# Patient Record
Sex: MALE | Race: BLACK OR AFRICAN AMERICAN | Employment: FULL TIME | URBAN - METROPOLITAN AREA
[De-identification: names, ages, dates, MRNs, and addresses within clinical notes are randomized per-mention and may not be internally consistent; named-entity substitution may affect disease eponyms.]

---

## 2017-01-03 ENCOUNTER — TELEPHONE (OUTPATIENT)
Dept: CARDIOLOGY CLINIC | Age: 72
End: 2017-01-03

## 2017-01-03 NOTE — TELEPHONE ENCOUNTER
Message        ----- Message from Rita Mae NP sent at 1/3/2017  4:44 PM EST -----       Hold 1 day prior to procedure, resuming evening of colonoscopy the the PM dose, in total she will miss 3 tabs. Called pt's wife,verified on hippa form, relayed message above to pt and she wrote everything down. She verbalized that she understood everything.

## 2017-01-03 NOTE — TELEPHONE ENCOUNTER
Returned pt's call,spoke to wife on hippa form, she stated that pt is having a colonscopy on the 1/13/17. When can pt stop his Eliquis prior to procedure? Please advise, thank you.

## 2017-01-13 ENCOUNTER — ANESTHESIA EVENT (OUTPATIENT)
Dept: ENDOSCOPY | Age: 72
End: 2017-01-13
Payer: COMMERCIAL

## 2017-01-13 ENCOUNTER — ANESTHESIA (OUTPATIENT)
Dept: ENDOSCOPY | Age: 72
End: 2017-01-13
Payer: COMMERCIAL

## 2017-01-13 PROCEDURE — 74011000250 HC RX REV CODE- 250

## 2017-01-13 PROCEDURE — 74011250636 HC RX REV CODE- 250/636

## 2017-01-13 RX ORDER — PROPOFOL 10 MG/ML
INJECTION, EMULSION INTRAVENOUS AS NEEDED
Status: DISCONTINUED | OUTPATIENT
Start: 2017-01-13 | End: 2017-01-13 | Stop reason: HOSPADM

## 2017-01-13 RX ORDER — LIDOCAINE HYDROCHLORIDE 20 MG/ML
INJECTION, SOLUTION EPIDURAL; INFILTRATION; INTRACAUDAL; PERINEURAL AS NEEDED
Status: DISCONTINUED | OUTPATIENT
Start: 2017-01-13 | End: 2017-01-13 | Stop reason: HOSPADM

## 2017-01-13 RX ADMIN — PROPOFOL 50 MG: 10 INJECTION, EMULSION INTRAVENOUS at 10:52

## 2017-01-13 RX ADMIN — PROPOFOL 150 MG: 10 INJECTION, EMULSION INTRAVENOUS at 10:42

## 2017-01-13 RX ADMIN — LIDOCAINE HYDROCHLORIDE 40 MG: 20 INJECTION, SOLUTION EPIDURAL; INFILTRATION; INTRACAUDAL; PERINEURAL at 10:42

## 2017-01-13 NOTE — ANESTHESIA POSTPROCEDURE EVALUATION
Post-Anesthesia Evaluation and Assessment    Patient: Indu Waddell MRN: [id number]  SSN: [id number]    YOB: 1945  Age: 73 y.o. Sex: male       Cardiovascular Function/Vital Signs  Visit Vitals    /61    Pulse (!) 53    Temp 36.8 °C (98.3 °F)    Resp 20    Ht 5' 8\" (1.727 m)    Wt 122.5 kg (270 lb 2 oz)    SpO2 100%    BMI 41.07 kg/m2       Patient is status post MAC anesthesia for Procedure(s):  COLONOSCOPY  COLON BIOPSY  ENDOSCOPIC POLYPECTOMY. Nausea/Vomiting: None    Postoperative hydration reviewed and adequate. Pain:  Pain Scale 1: Numeric (0 - 10) (01/13/17 1114)  Pain Intensity 1: 0 (01/13/17 1114)   Managed    Neurological Status: At baseline    Mental Status and Level of Consciousness: Arousable    Pulmonary Status:   O2 Device: Room air (01/13/17 1114)   Adequate oxygenation and airway patent    Complications related to anesthesia: None    Post-anesthesia assessment completed.  No concerns    Signed By: Iveth Castillo MD     January 13, 2017

## 2017-01-13 NOTE — ANESTHESIA PREPROCEDURE EVALUATION
Anesthetic History     Increased risk of difficult airway          Review of Systems / Medical History  Patient summary reviewed, nursing notes reviewed and pertinent labs reviewed    Pulmonary        Sleep apnea: CPAP           Neuro/Psych   Within defined limits           Cardiovascular    Hypertension        Dysrhythmias : atrial fibrillation  CAD    Exercise tolerance: >4 METS  Comments: Echo last year showed normal EF and normal valves   GI/Hepatic/Renal  Within defined limits              Endo/Other    Diabetes    Morbid obesity     Other Findings            Physical Exam    Airway  Mallampati: III  TM Distance: 4 - 6 cm  Neck ROM: decreased range of motion   Mouth opening: Normal     Cardiovascular  Regular rate and rhythm,  S1 and S2 normal,  no murmur, click, rub, or gallop  Rhythm: regular  Rate: normal         Dental  No notable dental hx       Pulmonary  Breath sounds clear to auscultation               Abdominal  GI exam deferred       Other Findings            Anesthetic Plan    ASA: 3  Anesthesia type: MAC            Anesthetic plan and risks discussed with: Patient

## 2017-03-07 ENCOUNTER — OFFICE VISIT (OUTPATIENT)
Dept: CARDIOLOGY CLINIC | Age: 72
End: 2017-03-07

## 2017-03-07 VITALS
SYSTOLIC BLOOD PRESSURE: 140 MMHG | DIASTOLIC BLOOD PRESSURE: 82 MMHG | WEIGHT: 268.8 LBS | HEART RATE: 60 BPM | HEIGHT: 68 IN | RESPIRATION RATE: 18 BRPM | OXYGEN SATURATION: 96 % | BODY MASS INDEX: 40.74 KG/M2

## 2017-03-07 DIAGNOSIS — I50.22 CHRONIC SYSTOLIC HEART FAILURE (HCC): ICD-10-CM

## 2017-03-07 DIAGNOSIS — I48.0 PAROXYSMAL ATRIAL FIBRILLATION (HCC): Primary | ICD-10-CM

## 2017-03-07 DIAGNOSIS — I10 ESSENTIAL HYPERTENSION, BENIGN: ICD-10-CM

## 2017-03-07 NOTE — MR AVS SNAPSHOT
Visit Information Date & Time Provider Department Dept. Phone Encounter #  
 3/7/2017 10:45 AM [address], MD [facility] [phone] [id number] Upcoming Health Maintenance Date Due Hepatitis C Screening 1945 FOOT EXAM Q1 9/18/1957 MICROALBUMIN Q1 9/18/1957 EYE EXAM RETINAL OR DILATED Q1 9/18/1957 DTaP/Tdap/Td series (1 - Tdap) 9/18/1968 FOBT Q 1 YEAR AGE 50-75 9/18/1997 ZOSTER VACCINE AGE 60> 9/18/2007 HEMOGLOBIN A1C Q6M 6/27/2011 LIPID PANEL Q1 12/28/2011 GLAUCOMA SCREENING Q2Y 9/18/2012 Pneumococcal 65+ Low/Medium Risk (1 of 2 - PCV13) 9/18/2012 MEDICARE YEARLY EXAM 9/18/2012 INFLUENZA AGE 9 TO ADULT 8/1/2016 Allergies as of 3/7/2017  Review Complete On: 3/7/2017 By: Meagan Hu NP No Known Allergies Current Immunizations  Never Reviewed No immunizations on file. Not reviewed this visit You Were Diagnosed With   
  
 Codes Comments Paroxysmal atrial fibrillation (HCC)    -  Primary ICD-10-CM: I48.0 ICD-9-CM: 427.31 Essential hypertension, benign     ICD-10-CM: I10 
ICD-9-CM: 335. 1 Chronic systolic heart failure (HCC)     ICD-10-CM: I50.22 ICD-9-CM: 428.22 Vitals BP Pulse Resp Height(growth percentile) Weight(growth percentile) SpO2  
 160/78 (BP 1 Location: Right arm, BP Patient Position: Sitting) 60 18 5' 8\" (1.727 m) 268 lb 12.8 oz (121.9 kg) 96% BMI Smoking Status 40.87 kg/m2 Never Smoker Vitals History BMI and BSA Data Body Mass Index Body Surface Area  
 40.87 kg/m 2 2.42 m 2 Preferred Pharmacy Pharmacy Name Phone [phone] - [address], [address] AT [place] [address] [phone] Your Updated Medication List  
  
   
This list is accurate as of: 3/7/17 11:27 AM.  Always use your most recent med list. amLODIPine 10 mg tablet Commonly known as:  [place] Citron Take  by mouth daily. carvedilol 6.25 mg tablet Commonly known as:  COREG  
TAKE 1 TABLET BY MOUTH TWICE DAILY  
  
 cpap machine kit  
by Does Not Apply route nightly. ELIQUIS 5 mg tablet Generic drug:  apixaban TAKE 1 TABLET BY MOUTH TWICE DAILY. STOP COUMADIN  
  
 hydroCHLOROthiazide 25 mg tablet Commonly known as:  HYDRODIURIL Take 25 mg by mouth daily. KLOR-CON 10 10 mEq tablet Generic drug:  potassium chloride SR Take 20 mEq by mouth daily. lisinopril 40 mg tablet Commonly known as:  Tiffani Acosta Take 40 mg by mouth daily. simvastatin 20 mg tablet Commonly known as:  ZOCOR Take  by mouth nightly. [facility] ASPIRIN 81 mg tablet Generic drug:  aspirin delayed-release Take  by mouth daily. We Performed the Following AMB POC EKG ROUTINE W/ 12 LEADS, INTER & REP [83772 CPT(R)] AMB POC EKG ROUTINE W/ 12 LEADS, INTER & REP [76271 CPT(R)] Introducing [facility] SERVICES! [facility] introduces [employer] patient portal. Now you can access parts of your medical record, email your doctor's office, and request medication refills online. 1. In your internet browser, go to [email or web address] 2. Click on the First Time User? Click Here link in the Sign In box. You will see the New Member Sign Up page. 3. Enter your [employer] Access Code exactly as it appears below. You will not need to use this code after youve completed the sign-up process. If you do not sign up before the expiration date, you must request a new code. · [employer] Access Code: [email or web address] Expires: 4/13/2017  9:23 AM 
 
4. Enter the last four digits of your Social Security Number (xxxx) and Date of Birth (mm/dd/yyyy) as indicated and click Submit. You will be taken to the next sign-up page. 5. Create a TheraCell ID.  This will be your TheraCell login ID and cannot be changed, so think of one that is secure and easy to remember. 6. Create a [employer] password. You can change your password at any time. 7. Enter your Password Reset Question and Answer. This can be used at a later time if you forget your password. 8. Enter your e-mail address. You will receive e-mail notification when new information is available in [address] 9. Click Sign Up. You can now view and download portions of your medical record. 10. Click the Download Summary menu link to download a portable copy of your medical information. If you have questions, please visit the Frequently Asked Questions section of the [employer] website. Remember, [employer] is NOT to be used for urgent needs. For medical emergencies, dial 911. Now available from your iPhone and Android! Please provide this summary of care documentation to your next provider. Your primary care clinician is listed as Catie Peres. If you have any questions after today's visit, please call [phone].

## 2017-03-07 NOTE — PROGRESS NOTES
Craven Cogan NP  Subjective/HPI:     Jah Talbert is a 73 y.o. male is here for routine f/u. The patient denies chest pain/ shortness of breath, orthopnea, PND, LE edema, palpitations, syncope, presyncope or fatigue. Mr. Lori Kuhn reports feeling in his usual state of health, denies fluttering or palpitations associated with atrial fibrillation: As maintained warfarin therapy without any reported excessive bruising or bleeding, black stools. Discussed elevated blood pressure today 24 hour diet review was moderately high in sodium intake. PCP Provider  Kavya Melvin MD  Past Medical History:   Diagnosis Date    Arrhythmia     a. fib    Cardiomegaly     Cardiomyopathy in other diseases classified elsewhere ([address]     Diabetes ([facility] [address]     BORDERLINE    Difficult intubation     Hypertension     Other acute and subacute form of ischemic heart disease     Other ill-defined conditions(799.89)     ATHEROSCLEROSIS/HYPERLIPIDEMIA    PAF (paroxysmal atrial fibrillation) (HCC)     Shortness of breath     Unspecified sleep apnea     USES C-PAP/pt dosent know dr name      Past Surgical History:   Procedure Laterality Date    ABDOMEN SURGERY PROC UNLISTED      LEFT INGUINAL HERNIA    COLONOSCOPY N/A 1/13/2017    COLONOSCOPY performed by Jazmin Silver MD at [address] [place] [address]  1/13/2017         HX GI      COLONOSCOPY    HX ORTHOPAEDIC  2006    BILAT TKR    IN COLSC FLX W/REMOVAL LESION BY HOT BX FORCEPS  5/14/2014          No Known Allergies   Family History   Problem Relation Age of Onset    Heart Disease Mother      w/pacemaker    Diabetes Father     Diabetes Sister       Current Outpatient Prescriptions   Medication Sig    ELIQUIS 5 mg tablet TAKE 1 TABLET BY MOUTH TWICE DAILY. STOP COUMADIN    carvedilol (COREG) 6.25 mg tablet TAKE 1 TABLET BY MOUTH TWICE DAILY    cpap machine kit by Does Not Apply route nightly.     simvastatin (ZOCOR) 20 mg tablet Take  by mouth nightly.  potassium chloride SR (KLOR-CON 10) 10 mEq tablet Take 20 mEq by mouth daily.  aspirin delayed-release ([facility] ASPIRIN) 81 mg tablet Take  by mouth daily.  lisinopril (PRINIVIL, ZESTRIL) 40 mg tablet Take 40 mg by mouth daily.  amlodipine (NORVASC) 10 mg tablet Take  by mouth daily.  hydrochlorothiazide (HYDRODIURIL) 25 mg tablet Take 25 mg by mouth daily. No current facility-administered medications for this visit. Vitals:    03/07/17 1046 03/07/17 1055 03/07/17 1127   BP: 152/80 160/78 140/82   Pulse: 60     Resp: 18     SpO2: 96%     Weight: 268 lb 12.8 oz (121.9 kg)     Height: 5' 8\" (1.727 m)       Social History     Social History    Marital status:      Spouse name: N/A    Number of children: N/A    Years of education: N/A     Occupational History    Not on file. Social History Main Topics    Smoking status: Never Smoker    Smokeless tobacco: Never Used    Alcohol use No    Drug use: No    Sexual activity: Not on file     Other Topics Concern    Not on file     Social History Narrative       I have reviewed the nurses notes, vitals, problem list, allergy list, medical history, family, social history and medications. Review of Symptoms:    General: Pt denies excessive weight gain or loss. Pt is able to conduct ADL's  HEENT: Denies blurred vision, headaches, epistaxis and difficulty swallowing. Respiratory: Denies shortness of breath, SERVIN, wheezing or stridor. Cardiovascular: Denies precordial pain, palpitations, edema or PND  Gastrointestinal: Denies poor appetite, indigestion, abdominal pain or blood in stool  Musculoskeletal: Denies pain or swelling from muscles or joints  Neurologic: Denies tremor, paresthesias, or sensory motor disturbance  Skin: Denies rash, itching or texture change. Physical Exam:      General: Well developed, in no acute distress, cooperative and alert  HEENT: No carotid bruits, no JVD, trach is midline. Neck Supple, PEERL, EOM intact. Heart:  Normal S1/S2 negative S3 or S4. Regular, no murmur, gallop or rub.   Respiratory: Clear bilaterally x 4, no wheezing or rales  Abdomen:   Soft, non-tender, no masses, bowel sounds are active.   Extremities:  No edema, normal cap refill, no cyanosis, atraumatic. Neuro: A&Ox3, speech clear, gait stable. Skin: Skin color is normal. No rashes or lesions.  Non diaphoretic  Vascular: 2+ pulses symmetric in all extremities    Cardiographics    ECG: Sinus bradycardia  Results for orders placed or performed during the hospital encounter of 12/27/10   EKG, 12 LEAD, INITIAL   Result Value Ref Range    Ventricular Rate 54 BPM    Atrial Rate 54 BPM    P-R Interval 176 ms    QRS Duration 128 ms    Q-T Interval 474 ms    QTC Calculation (Bezet) 449 ms    Calculated P Axis 44 degrees    Calculated R Axis -2 degrees    Calculated T Axis 84 degrees    Diagnosis       Sinus bradycardia with premature atrial complexes  Nonspecific intraventricular block  Nonspecific T wave abnormality  No major change  Confirmed by Carlos Braun M.D., Chaparrita Crealejandra ([id number]) on 12/27/2010 10:57:25 PM         Cardiology Labs:  Lab Results   Component Value Date/Time    Cholesterol, total 154 12/28/2010 03:10 AM    HDL Cholesterol 33 12/28/2010 03:10 AM    LDL, calculated 110.8 12/28/2010 03:10 AM    Triglyceride 51 12/28/2010 03:10 AM    CHOL/HDL Ratio 4.7 12/28/2010 03:10 AM       Lab Results   Component Value Date/Time    Sodium 142 06/29/2015 12:07 PM    Potassium 4.1 06/29/2015 12:07 PM    Chloride 101 06/29/2015 12:07 PM    CO2 26 06/29/2015 12:07 PM    Anion gap 11 12/28/2010 03:10 AM    Glucose 100 06/29/2015 12:07 PM    BUN 12 06/29/2015 12:07 PM    Creatinine 0.68 06/29/2015 12:07 PM    BUN/Creatinine ratio 18 06/29/2015 12:07 PM    GFR est  06/29/2015 12:07 PM    GFR est non-AA 99 06/29/2015 12:07 PM    Calcium 8.9 06/29/2015 12:07 PM    Bilirubin, total 0.5 12/27/2010 05:00 PM    AST (SGOT) 23 12/27/2010 05:00 PM    Alk. phosphatase 57 12/27/2010 05:00 PM    Protein, total 7.4 12/27/2010 05:00 PM    Albumin 3.2 12/27/2010 05:00 PM    Globulin 4.2 12/27/2010 05:00 PM    A-G Ratio 0.8 12/27/2010 05:00 PM    ALT (SGPT) 25 12/27/2010 05:00 PM           Assessment:     Assessment:     Ori Franklin was seen today for irregular heart beat. Diagnoses and all orders for this visit:    Paroxysmal atrial fibrillation (HCC)  -     AMB POC EKG ROUTINE W/ 12 LEADS, INTER & REP  -     AMB POC EKG ROUTINE W/ 12 LEADS, INTER & REP    Essential hypertension, benign    Chronic systolic heart failure ([facility] [address]        ICD-10-CM ICD-9-CM    1. Paroxysmal atrial fibrillation (HCC) I48.0 427.31 AMB POC EKG ROUTINE W/ 12 LEADS, INTER & REP      AMB POC EKG ROUTINE W/ 12 LEADS, INTER & REP   2. Essential hypertension, benign I10 401.1    3. Chronic systolic heart failure (HCC) I50.22 428.22      Orders Placed This Encounter    AMB POC EKG ROUTINE W/ 12 LEADS, INTER & REP     Order Specific Question:   Reason for Exam:     Answer:   ROUTINE    AMB POC EKG ROUTINE W/ 12 LEADS, INTER & REP     Order Specific Question:   Reason for Exam:     Answer:   ROUTINE        Plan:     Patient presents doing well and is stable from cardiac stand point. Continue current care and f/u in 6 months. 1.  Paroxysmal atrial fibrillation: Maintaining normal sinus rhythm bradycardic asymptomatic. Continue warfarin therapy  2. Hypertension repeat blood pressure improved 140/82 lengthy discussion with Mr. Bobbi Phan for diet exercise sodium restriction and weight loss with optimal goal of 10 pound weight loss in the next 6 months. 3.  Hyperlipidemia: Followed by primary care patient reports labs drawn approximately 2 months ago without any changes to medication  Monica Alba NP      [address] [facility]    3/7/2017         Agree with note as outlined by  NP. I confirm findings in history and physical exam. No additional findings noted.  Agree with plan as outlined above.     Shazia Tubbs MD

## 2017-05-04 RX ORDER — APIXABAN 5 MG/1
TABLET, FILM COATED ORAL
Qty: 60 TAB | Refills: 0 | Status: SHIPPED | OUTPATIENT
Start: 2017-05-04 | End: 2017-09-03 | Stop reason: SDUPTHER

## 2017-09-05 RX ORDER — CARVEDILOL 6.25 MG/1
TABLET ORAL
Qty: 60 TAB | Refills: 0 | Status: SHIPPED | OUTPATIENT
Start: 2017-09-05 | End: 2017-11-13 | Stop reason: SDUPTHER

## 2017-09-05 RX ORDER — APIXABAN 5 MG/1
TABLET, FILM COATED ORAL
Qty: 60 TAB | Refills: 0 | Status: SHIPPED | OUTPATIENT
Start: 2017-09-05 | End: 2017-09-12 | Stop reason: SDUPTHER

## 2017-09-05 RX ORDER — APIXABAN 5 MG/1
TABLET, FILM COATED ORAL
Qty: 60 TAB | Refills: 0 | Status: SHIPPED | OUTPATIENT
Start: 2017-09-05 | End: 2017-10-02 | Stop reason: SDUPTHER

## 2017-09-12 ENCOUNTER — OFFICE VISIT (OUTPATIENT)
Dept: CARDIOLOGY CLINIC | Age: 72
End: 2017-09-12

## 2017-09-12 VITALS
DIASTOLIC BLOOD PRESSURE: 72 MMHG | OXYGEN SATURATION: 98 % | HEART RATE: 44 BPM | SYSTOLIC BLOOD PRESSURE: 130 MMHG | RESPIRATION RATE: 20 BRPM | HEIGHT: 68 IN | BODY MASS INDEX: 41.97 KG/M2 | WEIGHT: 276.9 LBS

## 2017-09-12 DIAGNOSIS — I48.0 PAROXYSMAL ATRIAL FIBRILLATION (HCC): Primary | ICD-10-CM

## 2017-09-12 DIAGNOSIS — I10 ESSENTIAL HYPERTENSION: ICD-10-CM

## 2017-09-12 DIAGNOSIS — E78.00 HYPERCHOLESTEREMIA: ICD-10-CM

## 2017-09-12 DIAGNOSIS — I42.9 CARDIOMYOPATHY, UNSPECIFIED TYPE (HCC): ICD-10-CM

## 2017-09-12 NOTE — MR AVS SNAPSHOT
Visit Information Date & Time Provider Department Dept. Phone Encounter #  
 9/12/2017 10:00 AM Paramjit Rocha MD [facility] [phone] [id number] Follow-up Instructions Routing History Upcoming Health Maintenance Date Due Hepatitis C Screening 1945 FOOT EXAM Q1 9/18/1957 MICROALBUMIN Q1 9/18/1957 EYE EXAM RETINAL OR DILATED Q1 9/18/1957 DTaP/Tdap/Td series (1 - Tdap) 9/18/1968 FOBT Q 1 YEAR AGE 50-75 9/18/1997 ZOSTER VACCINE AGE 60> 7/18/2007 HEMOGLOBIN A1C Q6M 6/27/2011 LIPID PANEL Q1 12/28/2011 GLAUCOMA SCREENING Q2Y 9/18/2012 Pneumococcal 65+ Low/Medium Risk (1 of 2 - PCV13) 9/18/2012 MEDICARE YEARLY EXAM 9/18/2012 INFLUENZA AGE 9 TO ADULT 8/1/2017 Allergies as of 9/12/2017  Review Complete On: 9/12/2017 By: Almaz Malhotra NP No Known Allergies Current Immunizations  Never Reviewed No immunizations on file. Not reviewed this visit You Were Diagnosed With   
  
 Codes Comments Paroxysmal atrial fibrillation (HCC)    -  Primary ICD-10-CM: I48.0 ICD-9-CM: 427.31 Hypercholesteremia     ICD-10-CM: E78.00 ICD-9-CM: 272.0 Essential hypertension     ICD-10-CM: I10 
ICD-9-CM: 401.9 Cardiomyopathy, unspecified type ([facility]     ICD-10-CM: I42.9 ICD-9-CM: 425.4 Vitals BP Pulse Resp Height(growth percentile) Weight(growth percentile) SpO2  
 130/72 (BP 1 Location: Right arm, BP Patient Position: Sitting) (!) 44 20 5' 8\" (1.727 m) 276 lb 14.4 oz (125.6 kg) 98% BMI Smoking Status 42.1 kg/m2 Never Smoker Vitals History BMI and BSA Data Body Mass Index Body Surface Area  
 42.1 kg/m 2 2.45 m 2 [phone] [facility] [address], [address] AT [place] [address] [phone] Your Updated Medication List  
  
   
 This list is accurate as of: 9/12/17 10:59 AM.  Always use your most recent med list. amLODIPine 10 mg tablet Commonly known as:  [place] Bars Take  by mouth daily. carvedilol 6.25 mg tablet Commonly known as:  COREG  
TAKE 1 TABLET BY MOUTH TWICE DAILY  
  
 cpap machine kit  
by Does Not Apply route nightly. ELIQUIS 5 mg tablet Generic drug:  apixaban TAKE 1 TABLET BY MOUTH TWICE DAILY. STOP COUMADIN  
  
 hydroCHLOROthiazide 25 mg tablet Commonly known as:  HYDRODIURIL Take 25 mg by mouth daily. KLOR-CON 10 10 mEq tablet Generic drug:  potassium chloride SR Take 20 mEq by mouth daily. lisinopril 40 mg tablet Commonly known as:  Dorathy Massed Take 40 mg by mouth daily. simvastatin 20 mg tablet Commonly known as:  ZOCOR Take  by mouth nightly. [facility] ASPIRIN 81 mg tablet Generic drug:  aspirin delayed-release Take  by mouth daily. We Performed the Following AMB POC EKG ROUTINE W/ 12 LEADS, INTER & REP [25635 CPT(R)] To-Do List   
 09/13/2017 ECHO:  2D ECHO COMPLETE ADULT (TTE) W OR WO CONTR Introducing [facility] SERVICES! [facility] introduces [employer] patient portal. Now you can access parts of your medical record, email your doctor's office, and request medication refills online. 1. In your internet browser, go to [email or web address] 2. Click on the First Time User? Click Here link in the Sign In box. You will see the New Member Sign Up page. 3. Enter your [employer] Access Code exactly as it appears below. You will not need to use this code after youve completed the sign-up process. If you do not sign up before the expiration date, you must request a new code. · [employer] Access Code: [email or web address] Expires: 11/25/2017  1:54 PM 
 
4.  Enter the last four digits of your Social Security Number (xxxx) and Date of Birth (mm/dd/yyyy) as indicated and click Submit. You will be taken to the next sign-up page. 5. Create a [employer] ID. This will be your [employer] login ID and cannot be changed, so think of one that is secure and easy to remember. 6. Create a [employer] password. You can change your password at any time. 7. Enter your Password Reset Question and Answer. This can be used at a later time if you forget your password. 8. Enter your e-mail address. You will receive e-mail notification when new information is available in [address] 9. Click Sign Up. You can now view and download portions of your medical record. 10. Click the Download Summary menu link to download a portable copy of your medical information. If you have questions, please visit the Frequently Asked Questions section of the [employer] website. Remember, [employer] is NOT to be used for urgent needs. For medical emergencies, dial 911. Now available from your iPhone and Android! Please provide this summary of care documentation to your next provider. Your primary care clinician is listed as Luiza Aldana. If you have any questions after today's visit, please call [phone].

## 2017-09-12 NOTE — PROGRESS NOTES
Kaylin Beckham DNP, ANP-BC  Subjective/HPI:     Osmar Kearney is a 73 y.o. male is here for routine f/u. The patient denies chest pain/ shortness of breath, orthopnea, PND, LE edema, palpitations, syncope, presyncope or fatigue. Pt reports feeling in his usual state of health. PCP per patient drawing routine labs. PCP Provider  April Watt MD  Past Medical History:   Diagnosis Date    Arrhythmia     a. fib    Cardiomegaly     Cardiomyopathy in other diseases classified elsewhere     Diabetes ([facility] [address]     BORDERLINE    Difficult intubation     Hypertension     Other acute and subacute form of ischemic heart disease     Other ill-defined conditions     ATHEROSCLEROSIS/HYPERLIPIDEMIA    PAF (paroxysmal atrial fibrillation) (HCC)     Shortness of breath     Unspecified sleep apnea     USES C-PAP/pt dosent know dr name      Past Surgical History:   Procedure Laterality Date    ABDOMEN SURGERY PROC UNLISTED      LEFT INGUINAL HERNIA    COLONOSCOPY N/A 1/13/2017    COLONOSCOPY performed by Iggy Stewart MD at [address] [place] [address]  1/13/2017         HX GI      COLONOSCOPY    HX ORTHOPAEDIC  2006    BILAT TKR    ND COLSC FLX W/REMOVAL LESION BY HOT BX FORCEPS  5/14/2014          No Known Allergies   Family History   Problem Relation Age of Onset    Heart Disease Mother      w/pacemaker    Diabetes Father     Diabetes Sister       Current Outpatient Prescriptions   Medication Sig    ELIQUIS 5 mg tablet TAKE 1 TABLET BY MOUTH TWICE DAILY. STOP COUMADIN    carvedilol (COREG) 6.25 mg tablet TAKE 1 TABLET BY MOUTH TWICE DAILY    cpap machine kit by Does Not Apply route nightly.  simvastatin (ZOCOR) 20 mg tablet Take  by mouth nightly.  potassium chloride SR (KLOR-CON 10) 10 mEq tablet Take 20 mEq by mouth daily.  aspirin delayed-release (ST. ALEXANDER ASPIRIN) 81 mg tablet Take  by mouth daily.       lisinopril (PRINIVIL, ZESTRIL) 40 mg tablet Take 40 mg by mouth daily.  amlodipine (NORVASC) 10 mg tablet Take  by mouth daily.  hydrochlorothiazide (HYDRODIURIL) 25 mg tablet Take 25 mg by mouth daily. No current facility-administered medications for this visit. Vitals:    09/12/17 1011 09/12/17 1018   BP: 138/74 130/72   Pulse: (!) 44    Resp: 20    SpO2: 98%    Weight: 276 lb 14.4 oz (125.6 kg)    Height: 5' 8\" (1.727 m)      Social History     Social History    Marital status:      Spouse name: N/A    Number of children: N/A    Years of education: N/A     Occupational History    Not on file. Social History Main Topics    Smoking status: Never Smoker    Smokeless tobacco: Never Used    Alcohol use No    Drug use: No    Sexual activity: Not on file     Other Topics Concern    Not on file     Social History Narrative       I have reviewed the nurses notes, vitals, problem list, allergy list, medical history, family, social history and medications. Review of Symptoms:    General: Pt denies excessive weight gain or loss. Pt is able to conduct ADL's  HEENT: Denies blurred vision, headaches, epistaxis and difficulty swallowing. Respiratory: Denies shortness of breath, SERVIN, wheezing or stridor. Cardiovascular: Denies precordial pain, palpitations, edema or PND  Gastrointestinal: Denies poor appetite, indigestion, abdominal pain or blood in stool  Musculoskeletal: Denies pain or swelling from muscles or joints  Neurologic: Denies tremor, paresthesias, or sensory motor disturbance  Skin: Denies rash, itching or texture change. Physical Exam:      General: Well developed, in no acute distress, cooperative and alert  HEENT: No carotid bruits, no JVD, trach is midline. Neck Supple, PEERL, EOM intact. Heart:  Normal S1/S2 negative S3 or S4.  Regular, no murmur, gallop or rub.   Respiratory: Clear bilaterally x 4, no wheezing or rales  Abdomen:   Soft, non-tender, no masses, bowel sounds are active.   Extremities:  Trace ankle edema, normal cap refill, no cyanosis, atraumatic. Neuro: A&Ox3, speech clear, gait stable. Skin: Skin color is normal. No rashes or lesions. Non diaphoretic  Vascular: 2+ pulses symmetric in all extremities    Cardiographics    ECG: sinus bradycardia   Results for orders placed or performed during the hospital encounter of 12/27/10   EKG, 12 LEAD, INITIAL   Result Value Ref Range    Ventricular Rate 54 BPM    Atrial Rate 54 BPM    P-R Interval 176 ms    QRS Duration 128 ms    Q-T Interval 474 ms    QTC Calculation (Bezet) 449 ms    Calculated P Axis 44 degrees    Calculated R Axis -2 degrees    Calculated T Axis 84 degrees    Diagnosis       Sinus bradycardia with premature atrial complexes  Nonspecific intraventricular block  Nonspecific T wave abnormality  No major change  Confirmed by Karena Mckeon M.D., Chandrakant El ([id number]) on 12/27/2010 10:57:25 PM         Cardiology Labs:  Lab Results   Component Value Date/Time    Cholesterol, total 154 12/28/2010 03:10 AM    HDL Cholesterol 33 12/28/2010 03:10 AM    LDL, calculated 110.8 12/28/2010 03:10 AM    Triglyceride 51 12/28/2010 03:10 AM    CHOL/HDL Ratio 4.7 12/28/2010 03:10 AM       Lab Results   Component Value Date/Time    Sodium 142 06/29/2015 12:07 PM    Potassium 4.1 06/29/2015 12:07 PM    Chloride 101 06/29/2015 12:07 PM    CO2 26 06/29/2015 12:07 PM    Anion gap 11 12/28/2010 03:10 AM    Glucose 100 06/29/2015 12:07 PM    BUN 12 06/29/2015 12:07 PM    Creatinine 0.68 06/29/2015 12:07 PM    BUN/Creatinine ratio 18 06/29/2015 12:07 PM    GFR est  06/29/2015 12:07 PM    GFR est non-AA 99 06/29/2015 12:07 PM    Calcium 8.9 06/29/2015 12:07 PM    Bilirubin, total 0.5 12/27/2010 05:00 PM    AST (SGOT) 23 12/27/2010 05:00 PM    Alk.  phosphatase 57 12/27/2010 05:00 PM    Protein, total 7.4 12/27/2010 05:00 PM    Albumin 3.2 12/27/2010 05:00 PM    Globulin 4.2 12/27/2010 05:00 PM    A-G Ratio 0.8 12/27/2010 05:00 PM    ALT (SGPT) 25 12/27/2010 05:00 PM Assessment:     Assessment:     Diagnoses and all orders for this visit:    1. Paroxysmal atrial fibrillation (HCC)  -     AMB POC EKG ROUTINE W/ 12 LEADS, INTER & REP  -     2D ECHO COMPLETE ADULT (TTE) W OR WO CONTR; Future    2. Hypercholesteremia  -     2D ECHO COMPLETE ADULT (TTE) W OR WO CONTR; Future    3. Essential hypertension  -     2D ECHO COMPLETE ADULT (TTE) W OR WO CONTR; Future    4. Cardiomyopathy, unspecified type ([address]  -     2D ECHO COMPLETE ADULT (TTE) W OR WO CONTR; Future        ICD-10-CM ICD-9-CM    1. Paroxysmal atrial fibrillation (HCC) I48.0 427.31 AMB POC EKG ROUTINE W/ 12 LEADS, INTER & REP      2D ECHO COMPLETE ADULT (TTE) W OR WO CONTR   2. Hypercholesteremia E78.00 272.0 2D ECHO COMPLETE ADULT (TTE) W OR WO CONTR   3. Essential hypertension I10 401.9 2D ECHO COMPLETE ADULT (TTE) W OR WO CONTR   4. Cardiomyopathy, unspecified type ([address] I42.9 425.4 2D ECHO COMPLETE ADULT (TTE) W OR WO CONTR     Orders Placed This Encounter    AMB POC EKG ROUTINE W/ 12 LEADS, INTER & REP     Order Specific Question:   Reason for Exam:     Answer:   ROUTINE    2D ECHO COMPLETE ADULT (TTE) W OR WO CONTR     Standing Status:   Future     Standing Expiration Date:   3/12/2018     Order Specific Question:   Reason for Exam:     Answer:   CHF     Order Specific Question:   Contrast Enhancement (Bubble Study, Definity, Optison) may be used if criteria listed in established evidence-based protocol has been identified. Answer:   Yes        Plan:     1: Systolic heart failure: Compensated continue current medications repeat ECHO prior to years end  2: HTN: Controlled, encouraged diet, exercise and weight loss  3: High cholesterol: Followed by PCP  4: PAF: On NOAC, maintaining NSR, HR increased to 60bpm with walking and talking showing chronotropic competence. Follow up 6 months   Belinda Brothers NP      [facility]    9/14/2017         Agree with note as outlined by  NP.  I confirm findings in history and physical exam. No additional findings noted. Agree with plan as outlined above.      [address]ane Rodriguez MD

## 2017-09-25 ENCOUNTER — CLINICAL SUPPORT (OUTPATIENT)
Dept: CARDIOLOGY CLINIC | Age: 72
End: 2017-09-25

## 2017-09-25 DIAGNOSIS — I42.9 CARDIOMYOPATHY, UNSPECIFIED TYPE (HCC): ICD-10-CM

## 2017-09-25 DIAGNOSIS — I10 ESSENTIAL HYPERTENSION: ICD-10-CM

## 2017-09-25 DIAGNOSIS — I48.0 PAROXYSMAL ATRIAL FIBRILLATION (HCC): ICD-10-CM

## 2017-09-25 DIAGNOSIS — E78.00 HYPERCHOLESTEREMIA: ICD-10-CM

## 2017-10-02 RX ORDER — APIXABAN 5 MG/1
TABLET, FILM COATED ORAL
Qty: 60 TAB | Refills: 0 | Status: SHIPPED | OUTPATIENT
Start: 2017-10-02 | End: 2017-11-03 | Stop reason: SDUPTHER

## 2018-03-07 ENCOUNTER — OFFICE VISIT (OUTPATIENT)
Dept: SLEEP MEDICINE | Age: 73
End: 2018-03-07

## 2018-03-07 VITALS
DIASTOLIC BLOOD PRESSURE: 72 MMHG | OXYGEN SATURATION: 95 % | WEIGHT: 269 LBS | BODY MASS INDEX: 40.77 KG/M2 | HEIGHT: 68 IN | HEART RATE: 58 BPM | SYSTOLIC BLOOD PRESSURE: 150 MMHG

## 2018-03-07 DIAGNOSIS — G47.33 OSA (OBSTRUCTIVE SLEEP APNEA): Primary | ICD-10-CM

## 2018-03-07 DIAGNOSIS — Z86.79 H/O CHF: ICD-10-CM

## 2018-03-07 DIAGNOSIS — I10 ESSENTIAL HYPERTENSION, BENIGN: ICD-10-CM

## 2018-03-07 NOTE — PATIENT INSTRUCTIONS
[address] [facility] [place], [address]  Tel.  [phone]  Fax. [address]  [place], [address] [facility]  Tel.  [phone]  Fax. [phone] [address] [address] [address]  Tel.  [phone]  Fax. [phone]     Learning About CPAP for Sleep Apnea  What is CPAP? CPAP is a small machine that you use at home every night while you sleep. It increases air pressure in your throat to keep your airway open. When you have sleep apnea, this can help you sleep better so you feel much better. CPAP stands for \"continuous positive airway pressure. \"  The CPAP machine will have one of the following:  · A mask that covers your nose and mouth  · Prongs that fit into your nose  · A mask that covers your nose only, the most common type. This type is called NCPAP. The N stands for \"nasal.\"  Why is it done? CPAP is usually the best treatment for obstructive sleep apnea. It is the first treatment choice and the most widely used. Your doctor may suggest CPAP if you have:  · Moderate to severe sleep apnea. · Sleep apnea and coronary artery disease (CAD) or heart failure. How does it help? · CPAP can help you have more normal sleep, so you feel less sleepy and more alert during the daytime. · CPAP may help keep heart failure or other heart problems from getting worse. · NCPAP may help lower your blood pressure. · If you use CPAP, your bed partner may also sleep better because you are not snoring or restless. What are the side effects? Some people who use CPAP have:  · A dry or stuffy nose and a sore throat. · Irritated skin on the face. · Sore eyes. · Bloating. If you have any of these problems, work with your doctor to fix them. Here are some things you can try:  · Be sure the mask or nasal prongs fit well. · See if your doctor can adjust the pressure of your CPAP. · If your nose is dry, try a humidifier.   · If your nose is runny or stuffy, try decongestant medicine or a steroid nasal spray. If these things do not help, you might try a different type of machine. Some machines have air pressure that adjusts on its own. Others have air pressures that are different when you breathe in than when you breathe out. This may reduce discomfort caused by too much pressure in your nose. Where can you learn more? Go to Teez.mobi.be  Enter Kate Lofton in the search box to learn more about \"Learning About CPAP for Sleep Apnea. \"   © 7565-1277 Healthwise, Incorporated. Care instructions adapted under license by [facility] [employer] (which disclaims liability or warranty for this information). This care instruction is for use with your licensed healthcare professional. If you have questions about a medical condition or this instruction, always ask your healthcare professional. [address] any warranty or liability for your use of this information. Content Version: 3.7.52159; Last Revised: January 11, 2010  PROPER SLEEP HYGIENE    What to avoid  · Do not have drinks with caffeine, such as coffee or black tea, for 8 hours before bed. · Do not smoke or use other types of tobacco near bedtime. Nicotine is a stimulant and can keep you awake. · Avoid drinking alcohol late in the evening, because it can cause you to wake in the middle of the night. · Do not eat a big meal close to bedtime. If you are hungry, eat a light snack. · Do not drink a lot of water close to bedtime, because the need to urinate may wake you up during the night. · Do not read or watch TV in bed. Use the bed only for sleeping and sexual activity. What to try  · Go to bed at the same time every night, and wake up at the same time every morning. Do not take naps during the day. · Keep your bedroom quiet, dark, and cool. · Get regular exercise, but not within 3 to 4 hours of your bedtime. .  · Sleep on a comfortable pillow and mattress.   · If watching the clock makes you anxious, turn it facing away from you so you cannot see the time. · If you worry when you lie down, start a worry book. Well before bedtime, write down your worries, and then set the book and your concerns aside. · Try meditation or other relaxation techniques before you go to bed. · If you cannot fall asleep, get up and go to another room until you feel sleepy. Do something relaxing. Repeat your bedtime routine before you go to bed again. · Make your house quiet and calm about an hour before bedtime. Turn down the lights, turn off the TV, log off the computer, and turn down the volume on music. This can help you relax after a busy day. Drowsy Driving: The [facility] 54 cites drowsiness as a causing factor in more than 994,195 police reported crashes annually, resulting in 76,000 injuries and 1,500 deaths. Other surveys suggest 55% of people polled have driven while drowsy in the past year, 23% had fallen asleep but not crashed, 3% crashed, and 2% had and accident due to drowsy driving. Who is at risk? Young Drivers: One study of drowsy driving accidents states that 55% of the drivers were under 25 years. Of those, 75% were male. Shift Workers and Travelers: People who work overnight or travel across time zones frequently are at higher risk of experiencing Circadian Rhythm Disorders. They are trying to work and function when their body is programed to sleep. Sleep Deprived: Lack of sleep has a serious impact on your ability to pay attention or focus on a task. Consistently getting less than the average of 8 hours your body needs creates partial or cumulative sleep deprivation. Untreated Sleep Disorders: Sleep Apnea, Narcolepsy, R.L.S., and other sleep disorders (untreated) prevent a person from getting enough restful sleep. This leads to excessive daytime sleepiness and increases the risk for drowsy driving accidents by up to 7 times.   Medications / Alcohol: Even over the counter medications can cause drowsiness. Medications that impair a drivers attention should have a warning label. Alcohol naturally makes you sleepy and on its own can cause accidents. Combined with excessive drowsiness its effects are amplified. Signs of Drowsy Driving:   * You don't remember driving the last few miles   * You may drift out of your gladys   * You are unable to focus and your thoughts wander   * You may yawn more often than normal   * You have difficulty keeping your eyes open / nodding off   * Missing traffic signs, speeding, or tailgating  Prevention-   Good sleep hygiene, lifestyle and behavioral choices have the most impact on drowsy driving. There is no substitute for sleep and the average person requires 8 hours nightly. If you find yourself driving drowsy, stop and sleep. Consider the sleep hygiene tips provided during your visit as well. Medication Refill Policy: Refills for all medications require 1 week advance notice. Please have your pharmacy fax a refill request. We are unable to fax, or call in \"controled substance\" medications and you will need to pick these prescriptions up from our office. [employer] Activation    Thank you for requesting access to [employer]. Please follow the instructions below to securely access and download your online medical record. Screenz allows you to send messages to your doctor, view your test results, renew your prescriptions, schedule appointments, and more. How Do I Sign Up? 1. In your internet browser, go to [email or web address]. 2. Click on the First Time User? Click Here link in the Sign In box. You will see the New Member Sign Up page. 3. Enter your Screen[employer] Access Code exactly as it appears below. You will not need to use this code after youve completed the sign-up process. If you do not sign up before the expiration date, you must request a new code.     Screenz Access Code: [email or web address]  Expires: 2018  3:05 PM (This is the date your [employer] access code will )    4. Enter the last four digits of your Social Security Number (xxxx) and Date of Birth (mm/dd/yyyy) as indicated and click Submit. You will be taken to the next sign-up page. 5. Create a [employer] ID. This will be your [employer] login ID and cannot be changed, so think of one that is secure and easy to remember. 6. Create a [employer] password. You can change your password at any time. 7. Enter your Password Reset Question and Answer. This can be used at a later time if you forget your password. 8. Enter your e-mail address. You will receive e-mail notification when new information is available in [address] 9. Click Sign Up. You can now view and download portions of your medical record. 10. Click the Download Summary menu link to download a portable copy of your medical information. Additional Information    If you have questions, please call [phone]. Remember, [employer] is NOT to be used for urgent needs. For medical emergencies, dial 911.

## 2018-03-07 NOTE — PROGRESS NOTES
[address] [facility] [place], [address]  Tel.  [phone]  Fax. 100 [facility] [address]  [place], [address] [facility]  Tel.  [phone]  Fax. [phone] [address]  Tel.  [phone]  Fax. [phone]     S>Frank Norris is a 81 y.o. male seen for a positive airway pressure follow-up. He reports no problems using the device. He is ?% compliant over the past ? Days. Device or download not available for review. The following problems are identified:    Drowsiness no Problems exhaling no   Snoring no Forget to put on no   Mask Comfortable yes Can't fall asleep no   Dry Mouth no Mask falls off no   Air Leaking no Frequent awakenings no       He admits that his sleep has improved. No Known Allergies    He has a current medication list which includes the following prescription(s): carvedilol, eliquis, cpap machine, simvastatin, potassium chloride sr, aspirin delayed-release, lisinopril, amlodipine, and hydrochlorothiazide. .      He  has a past medical history of Arrhythmia; Cardiomegaly; Cardiomyopathy in other diseases classified elsewhere; Diabetes ([address] Difficult intubation; Hypertension; Other acute and subacute form of ischemic heart disease; Other ill-defined conditions(799.89); PAF (paroxysmal atrial fibrillation) ([address] Shortness of breath; and Unspecified sleep apnea. [place] Sleepiness Score: 5   and Modified F.O.S.Q. Score Total / 2: 20   which reflect improved sleep quality over therapy time.     O>    Visit Vitals    /72    Pulse (!) 58    Ht 5' 8\" (1.727 m)    Wt 269 lb (122 kg)    SpO2 95%    BMI 40.9 kg/m2         General:   Not in acute distress   Eyes:  Anicteric sclerae, no obvious strabismus   Nose:  No obvious nasal septum deviation    Oropharynx:   Class 4 oropharyngeal outlet, thick tongue base, uvula not seen due to low-lying soft palate, narrow tonsilo-pharyngeal pilars   Tonsils:   tonsils are not visualized due to low-lying soft palate   Neck:   midline trachea   Chest/Lungs:  Equal lung expansion, clear on auscultation    CVS:  Normal rate, regular rhythm; no JVD   Skin:  Warm to touch; no obvious rashes   Neuro:  No focal deficits ; no obvious tremor    Psych:  Normal affect,  normal countenance;           A>    ICD-10-CM ICD-9-CM    1. NATASHA (obstructive sleep apnea) G47.33 327.23 SLEEP LAB (PAP TITRATION)      AMB SUPPLY ORDER   2. Essential hypertension, benign I10 401.1    3. BMI 40.0-44.9, adult ([facility] [address] Z68.41 V85.41    4. H/O CHF Z86.79 V12.59      AHI = 33 (2015). On CPAP :  9 cmH2O. Compliant:      yes    Therapeutic Response:  Positive    P>    * Repeat testing ordered due to interim weight loss since initial evaluation. * We have recommended a dedicated weight loss through appropriate diet and an exercise regiment as significant weight reduction has been shown to reduce severity of obstructive sleep apnea. * Follow-up Disposition:  Return in about 1 year (around 3/7/2019), or if symptoms worsen or fail to improve. * He was asked to contact our office for any problems regarding PAP therapy. * Counseling was provided regarding the importance of regular PAP use and on proper sleep hygiene and safe driving. * Re-enforced proper and regular cleaning for the device. Thank you for allowing us to participate in your patient's medical care. Arlene Whitaker MD, FAASM  Electronically signed.  03/08/18

## 2018-03-21 ENCOUNTER — OFFICE VISIT (OUTPATIENT)
Dept: CARDIOLOGY CLINIC | Age: 73
End: 2018-03-21

## 2018-03-21 VITALS
SYSTOLIC BLOOD PRESSURE: 140 MMHG | WEIGHT: 272.5 LBS | HEIGHT: 68 IN | OXYGEN SATURATION: 98 % | BODY MASS INDEX: 41.3 KG/M2 | HEART RATE: 51 BPM | DIASTOLIC BLOOD PRESSURE: 68 MMHG

## 2018-03-21 DIAGNOSIS — I10 ESSENTIAL HYPERTENSION, BENIGN: ICD-10-CM

## 2018-03-21 DIAGNOSIS — I48.0 PAF (PAROXYSMAL ATRIAL FIBRILLATION) (HCC): Primary | ICD-10-CM

## 2018-03-21 DIAGNOSIS — E78.2 MIXED HYPERLIPIDEMIA: ICD-10-CM

## 2018-03-21 DIAGNOSIS — I50.22 CHRONIC SYSTOLIC HEART FAILURE (HCC): ICD-10-CM

## 2018-03-21 PROBLEM — E66.01 OBESITY, MORBID (HCC): Status: ACTIVE | Noted: 2018-03-21

## 2018-03-21 NOTE — PROGRESS NOTES
Chief Complaint   Patient presents with    Irregular Heart Beat     6 MONTH FOLLOW UP     1. Have you been to the ER, urgent care clinic since your last visit? Hospitalized since your last visit? NO    2. Have you seen or consulted any other health care providers outside of the [address] [place] [address] since your last visit? Include any pap smears or colon screening.  NO

## 2018-03-21 NOTE — MR AVS SNAPSHOT
[address] [facility] 
[phone] Patient: Arnaldo Dhillon MRN: [id number] AMF:4[birth year] Visit Information Date & Time Provider Department Dept. Phone Encounter #  
 3/21/2018 11:00 AM [address], MD [facility] [phone] [id number] Upcoming Health Maintenance Date Due Hepatitis C Screening 1945 FOOT EXAM Q1 9/18/1957 MICROALBUMIN Q1 9/18/1957 EYE EXAM RETINAL OR DILATED Q1 9/18/1957 DTaP/Tdap/Td series (1 - Tdap) 9/18/1968 FOBT Q 1 YEAR AGE 50-75 9/18/1997 ZOSTER VACCINE AGE 60> 7/18/2007 HEMOGLOBIN A1C Q6M 6/27/2011 LIPID PANEL Q1 12/28/2011 GLAUCOMA SCREENING Q2Y 9/18/2012 Pneumococcal 65+ Low/Medium Risk (1 of 2 - PCV13) 9/18/2012 Influenza Age 5 to Adult 8/1/2017 Allergies as of 3/21/2018  Review Complete On: 3/21/2018 By: [facility] No Known Allergies Current Immunizations  Never Reviewed No immunizations on file. Not reviewed this visit You Were Diagnosed With   
  
 Codes Comments PAF (paroxysmal atrial fibrillation) ([facility]    -  Primary ICD-10-CM: I48.0 ICD-9-CM: 427.31 Essential hypertension, benign     ICD-10-CM: I10 
ICD-9-CM: 068. 1 Chronic systolic heart failure (HCC)     ICD-10-CM: I50.22 ICD-9-CM: 428.22 Mixed hyperlipidemia     ICD-10-CM: E78.2 ICD-9-CM: 272.2 Vitals BP Pulse Height(growth percentile) Weight(growth percentile) SpO2 BMI  
 140/68 (BP 1 Location: Right arm, BP Patient Position: Sitting) (!) 51 5' 8\" (1.727 m) 272 lb 8 oz (123.6 kg) 98% 41.43 kg/m2 Smoking Status Never Smoker Vitals History BMI and BSA Data Body Mass Index Body Surface Area  
 41.43 kg/m 2 2.44 m 2 Preferred Pharmacy Pharmacy Name Phone  [facility] [address] [place] [facility] [place] [address] [phone] Your Updated Medication List  
  
   
This list is accurate as of 3/21/18 11:38 AM.  Always use your most recent med list. amLODIPine 10 mg tablet Commonly known as:  Claudell Kalpesh Take  by mouth daily. carvedilol 6.25 mg tablet Commonly known as:  COREG  
TAKE 1 TABLET BY MOUTH TWICE DAILY  
  
 cpap machine kit  
by Does Not Apply route nightly. ELIQUIS 5 mg tablet Generic drug:  apixaban TAKE 1 TABLET BY MOUTH TWICE DAILY. STOP COUMADIN  
  
 hydroCHLOROthiazide 25 mg tablet Commonly known as:  HYDRODIURIL Take 25 mg by mouth daily. KLOR-CON 10 10 mEq tablet Generic drug:  potassium chloride SR Take 20 mEq by mouth daily. lisinopril 40 mg tablet Commonly known as:  Hildy Lovings Take 40 mg by mouth daily. simvastatin 20 mg tablet Commonly known as:  ZOCOR Take  by mouth nightly. [facility] ASPIRIN 81 mg tablet Generic drug:  aspirin delayed-release Take  by mouth daily. We Performed the Following AMB POC EKG ROUTINE W/ 12 LEADS, INTER & REP [53108 CPT(R)] To-Do List   
 04/29/2018 9:30 PM  
  Appointment with [facility] [address] at [facility] [address] [place] [address] ([phone]) 1. Do not take a nap the day of the study  2. No caffeine after 12 noon the day of the study  3. Bring a 2 piece sleeping garment  4. Hair should be clean and dry, no oils, sprays, powders and remove wigs, weaves or other hair accessories  6. Patient should eat dinner prior to arriving for the test and a light breakfast will be provided upon discharge in the morning  7. Patient encouraged to bring activity items such as books, magazines, laptop, IPAD, etc, as well as toiletry items needed for the next morning  8. Bring all medications with you to the center  9.  For specific questions please contact the sleep center directly, 187p to 1b 10. Do not arrive more than 15 minutes prior to appt time and use the doorbell to enter the sleep center. Introducing [facility] & HEALTH SERVICES! Marilyn Michaels introduces LineHop patient portal. Now you can access parts of your medical record, email your doctor's office, and request medication refills online. 1. In your internet browser, go to [email or web address] 2. Click on the First Time User? Click Here link in the Sign In box. You will see the New Member Sign Up page. 3. Enter your LineHop Access Code exactly as it appears below. You will not need to use this code after youve completed the sign-up process. If you do not sign up before the expiration date, you must request a new code. · LineHop Access Code: [email or web address] Expires: 6/5/2018  4:05 PM 
 
4. Enter the last four digits of your Social Security Number (xxxx) and Date of Birth (mm/dd/yyyy) as indicated and click Submit. You will be taken to the next sign-up page. 5. Create a LineHop ID. This will be your LineHop login ID and cannot be changed, so think of one that is secure and easy to remember. 6. Create a LineHop password. You can change your password at any time. 7. Enter your Password Reset Question and Answer. This can be used at a later time if you forget your password. 8. Enter your e-mail address. You will receive e-mail notification when new information is available in [address] 9. Click Sign Up. You can now view and download portions of your medical record. 10. Click the Download Summary menu link to download a portable copy of your medical information. If you have questions, please visit the Frequently Asked Questions section of the LineHop website. Remember, LineHop is NOT to be used for urgent needs. For medical emergencies, dial 911. Now available from your iPhone and Android! Please provide this summary of care documentation to your next provider. Your primary care clinician is listed as Damian Leone. If you have any questions after today's visit, please call [phone].

## 2018-03-21 NOTE — PROGRESS NOTES
[address] [address]  [phone]     Subjective: Marco A Malik is a 81 y.o. male is here for routine f/u on PAF, HTN, HLD. The patient denies chest pain/ shortness of breath, orthopnea, PND, LE edema, palpitations, syncope, or presyncope. Walks 30 mins on the treadmill 3x/wk with no problem.     Patient Active Problem List    Diagnosis Date Noted    PAF (paroxysmal atrial fibrillation) ([address]     Atrial fibrillation ([address] 11/14/2012    Essential hypertension, benign 05/02/2012    Chronic systolic heart failure ([address] 05/02/2012    Type II or unspecified type diabetes mellitus without mention of complication, not stated as uncontrolled 05/02/2012    Obesity, unspecified 05/02/2012    Status post total knee replacement 12/26/2010    Aseptic loosening of prosthetic knee ([address] 12/26/2010    DM type 2 (diabetes mellitus, type 2) ([address] 12/26/2010    Hypercholesteremia 12/26/2010    HTN (hypertension) 12/26/2010    Cardiomyopathy ([address] 12/26/2010    Sleep apnea 12/26/2010      Ej Schreiber MD  Past Medical History:   Diagnosis Date    Arrhythmia     a. fib    Cardiomegaly     Cardiomyopathy in other diseases classified elsewhere     Diabetes ([address]     BORDERLINE    Difficult intubation     Hypertension     Other acute and subacute form of ischemic heart disease     Other ill-defined conditions(799.89)     ATHEROSCLEROSIS/HYPERLIPIDEMIA    PAF (paroxysmal atrial fibrillation) (HCC)     Shortness of breath     Unspecified sleep apnea     USES C-PAP/pt dosent know dr name      Past Surgical History:   Procedure Laterality Date    ABDOMEN SURGERY PROC UNLISTED      LEFT INGUINAL HERNIA    COLONOSCOPY N/A 1/13/2017    COLON[facility]  1/13/2017         HX GI      COLONOSCOPY    HX ORTHOPAEDIC  2006    BILAT TKR    CO COLSC FLX W/REMOVAL LESION BY HOT BX FORCEPS 5/14/2014          No Known Allergies   Family History   Problem Relation Age of Onset    Heart Disease Mother      w/pacemaker    Diabetes Father     Diabetes Sister       Social History     Social History    Marital status:      Spouse name: N/A    Number of children: N/A    Years of education: N/A     Occupational History    Not on file. Social History Main Topics    Smoking status: Never Smoker    Smokeless tobacco: Never Used    Alcohol use No    Drug use: No    Sexual activity: Not on file     Other Topics Concern    Not on file     Social History Narrative      Current Outpatient Prescriptions   Medication Sig    carvedilol (COREG) 6.25 mg tablet TAKE 1 TABLET BY MOUTH TWICE DAILY    ELIQUIS 5 mg tablet TAKE 1 TABLET BY MOUTH TWICE DAILY. STOP COUMADIN    cpap machine kit by Does Not Apply route nightly.  simvastatin (ZOCOR) 20 mg tablet Take  by mouth nightly.  potassium chloride SR (KLOR-CON 10) 10 mEq tablet Take 20 mEq by mouth daily.  aspirin delayed-release ([facility]. [facility] ASPIRIN) 81 mg tablet Take  by mouth daily.  lisinopril (PRINIVIL, ZESTRIL) 40 mg tablet Take 40 mg by mouth daily.  amlodipine (NORVASC) 10 mg tablet Take  by mouth daily.  hydrochlorothiazide (HYDRODIURIL) 25 mg tablet Take 25 mg by mouth daily. No current facility-administered medications for this visit. Review of Symptoms:  11 systems reviewed, negative other than as stated in the HPI    Physical ExamPhysical Exam:    Vitals:    03/21/18 1101 03/21/18 1110   BP: 142/70 140/68   Pulse: (!) 56    SpO2: 99%    Weight: 272 lb 8 oz (123.6 kg)    Height: 5' 8\" (1.727 m)      Body mass index is 41.43 kg/(m^2). General PE   Gen:  NAD  Mental Status - Alert. General Appearance - Not in acute distress. Chest and Lung Exam   Inspection: Accessory muscles - No use of accessory muscles in breathing.    Auscultation:   Breath sounds: - Normal.   Cardiovascular   Inspection: Jugular vein - Bilateral - Inspection Normal.   Palpation/Percussion:   Apical Impulse: - Normal.   Auscultation: Rhythm - Regular. Heart Sounds - S1 WNL and S2 WNL. No S3 or S4. Murmurs & Other Heart Sounds: Auscultation of the heart reveals - No Murmurs. Peripheral Vascular   Upper Extremity: Inspection - Bilateral - No Cyanotic nailbeds or Digital clubbing. Lower Extremity:   Palpation: Edema - Bilateral - No edema. Abdomen:   Soft, non-tender, bowel sounds are active. Neuro: A&O times 3, CN and motor grossly WNL    Labs:   Lab Results   Component Value Date/Time    Cholesterol, total 154 12/28/2010 03:10 AM    HDL Cholesterol 33 12/28/2010 03:10 AM    LDL, calculated 110.8 (H) 12/28/2010 03:10 AM    Triglyceride 51 12/28/2010 03:10 AM    CHOL/HDL Ratio 4.7 12/28/2010 03:10 AM     Lab Results   Component Value Date/Time     (H) 12/27/2010 05:00 PM     Lab Results   Component Value Date/Time    Sodium 142 06/29/2015 12:07 PM    Potassium 4.1 06/29/2015 12:07 PM    Chloride 101 06/29/2015 12:07 PM    CO2 26 06/29/2015 12:07 PM    Anion gap 11 12/28/2010 03:10 AM    Glucose 100 (H) 06/29/2015 12:07 PM    BUN 12 06/29/2015 12:07 PM    Creatinine 0.68 (L) 06/29/2015 12:07 PM    BUN/Creatinine ratio 18 06/29/2015 12:07 PM    GFR est  06/29/2015 12:07 PM    GFR est non-AA 99 06/29/2015 12:07 PM    Calcium 8.9 06/29/2015 12:07 PM    Bilirubin, total 0.5 12/27/2010 05:00 PM    AST (SGOT) 23 12/27/2010 05:00 PM    Alk. phosphatase 57 12/27/2010 05:00 PM    Protein, total 7.4 12/27/2010 05:00 PM    Albumin 3.2 (L) 12/27/2010 05:00 PM    Globulin 4.2 (H) 12/27/2010 05:00 PM    A-G Ratio 0.8 (L) 12/27/2010 05:00 PM    ALT (SGPT) 25 12/27/2010 05:00 PM       EKG:  SB, ventricular rate 47, unchanged from previous tracing     Assessment:     Assessment:      1. PAF (paroxysmal atrial fibrillation) ([facility]    2. Essential hypertension, benign    3. Chronic systolic heart failure ([facility]    4.  Mixed hyperlipidemia Orders Placed This Encounter    AMB POC EKG ROUTINE W/ 12 LEADS, INTER & REP     Order Specific Question:   Reason for Exam:     Answer:   ROUTINE        Plan:     Systolic heart failure  07/03 Echo showed improved EF 55-60%. Clinically compensated. Continue Coreg, Lisinopril. HTN: Controlled. States SBP runs b/w 130s-140s on home log. Encourage low salt diet. Exercises 3x/wk. Continue Amlodipine, Coreg, Lisinopril, HCTZ    HLD: Followed by PCP. Continue Simvastatin    PAF: Maintaining Sinus. On Eliquis for anticoagulation; tolerating well with no overt bleeding. NATASHA: Compliant with CPAP therapy      Doing well with no cardiac symptoms. Lipids and labs followed by PCP. Continue current care and f/u in 6 months. Kathryn Gama NP       [facility]    3/21/2018         Patient seen, examined by me personally. Plan discussed as detailed. Agree with note as outlined by  NP. I confirm findings in history and physical exam. No additional findings noted. Agree with plan as outlined above.      Antonio Simpson MD

## 2018-04-26 ENCOUNTER — TELEPHONE (OUTPATIENT)
Dept: CARDIOLOGY CLINIC | Age: 73
End: 2018-04-26

## 2018-04-26 NOTE — TELEPHONE ENCOUNTER
Verified patient with two identifiers. Spoke with pt advising him to hold Eliquis 2 days before having teeth extraction, resume following day. Pt verbalized understanding.         GONZALES Macias LPN        Caller: Unspecified (Today, 11:39 AM)                     2 days

## 2018-04-26 NOTE — TELEPHONE ENCOUNTER
Please call patient wants instructions on how long to stop eliquis before he s/d's teeth extraction.  Thanks

## 2018-04-29 ENCOUNTER — HOSPITAL ENCOUNTER (OUTPATIENT)
Dept: SLEEP MEDICINE | Age: 73
Discharge: HOME | End: 2018-04-29
Payer: COMMERCIAL

## 2018-04-29 DIAGNOSIS — G47.33 OSA (OBSTRUCTIVE SLEEP APNEA): ICD-10-CM

## 2018-04-29 PROCEDURE — 95811 POLYSOM 6/>YRS CPAP 4/> PARM: CPT | Performed by: INTERNAL MEDICINE

## 2018-05-01 ENCOUNTER — TELEPHONE (OUTPATIENT)
Dept: SLEEP MEDICINE | Age: 73
End: 2018-05-01

## 2018-05-02 NOTE — TELEPHONE ENCOUNTER
Titration study interpreted. * Device pressure change to CPAP  8 cmH2O by lead technologist either remotely or at PAP clinic (notify patient).     * PAP card download in 4 weeks.     * Office visit in 10 months or as needed.

## 2018-05-03 ENCOUNTER — TELEPHONE (OUTPATIENT)
Dept: SLEEP MEDICINE | Age: 73
End: 2018-05-03

## 2018-05-16 ENCOUNTER — OFFICE VISIT (OUTPATIENT)
Dept: SLEEP MEDICINE | Age: 73
End: 2018-05-16

## 2018-05-16 DIAGNOSIS — G47.33 OSA (OBSTRUCTIVE SLEEP APNEA): Primary | ICD-10-CM

## 2018-05-16 NOTE — PROGRESS NOTES
PAP pressure settings change made in office to [address] per Dr. Ana Luisa Chang

## 2018-06-06 RX ORDER — APIXABAN 5 MG/1
TABLET, FILM COATED ORAL
Qty: 60 TAB | Refills: 0 | Status: SHIPPED | OUTPATIENT
Start: 2018-06-06 | End: 2018-07-09 | Stop reason: SDUPTHER

## 2018-07-10 RX ORDER — APIXABAN 5 MG/1
TABLET, FILM COATED ORAL
Qty: 60 TAB | Refills: 0 | Status: SHIPPED | OUTPATIENT
Start: 2018-07-10 | End: 2018-08-11 | Stop reason: SDUPTHER

## 2018-09-25 ENCOUNTER — TELEPHONE (OUTPATIENT)
Dept: SLEEP MEDICINE | Age: 73
End: 2018-09-25

## 2018-09-25 ENCOUNTER — OFFICE VISIT (OUTPATIENT)
Dept: CARDIOLOGY CLINIC | Age: 73
End: 2018-09-25

## 2018-09-25 VITALS
OXYGEN SATURATION: 97 % | WEIGHT: 273.1 LBS | RESPIRATION RATE: 16 BRPM | HEART RATE: 40 BPM | DIASTOLIC BLOOD PRESSURE: 70 MMHG | SYSTOLIC BLOOD PRESSURE: 130 MMHG | BODY MASS INDEX: 41.39 KG/M2 | HEIGHT: 68 IN

## 2018-09-25 DIAGNOSIS — E78.2 MIXED HYPERLIPIDEMIA: ICD-10-CM

## 2018-09-25 DIAGNOSIS — G47.33 OSA (OBSTRUCTIVE SLEEP APNEA): Primary | ICD-10-CM

## 2018-09-25 DIAGNOSIS — I48.0 PAF (PAROXYSMAL ATRIAL FIBRILLATION) (HCC): Primary | ICD-10-CM

## 2018-09-25 DIAGNOSIS — I10 ESSENTIAL HYPERTENSION, BENIGN: ICD-10-CM

## 2018-09-25 DIAGNOSIS — I50.22 CHRONIC SYSTOLIC HEART FAILURE (HCC): ICD-10-CM

## 2018-09-25 DIAGNOSIS — G47.33 OSA (OBSTRUCTIVE SLEEP APNEA): ICD-10-CM

## 2018-09-25 RX ORDER — METFORMIN HYDROCHLORIDE 500 MG/1
500 TABLET ORAL 2 TIMES DAILY WITH MEALS
COMMUNITY
Start: 2018-09-01

## 2018-09-25 NOTE — MR AVS SNAPSHOT
[address] [facility] 
[phone] Patient: Michael Mccrary MRN: [id number] DIN:[birth year] Visit Information Date & Time Provider Department Dept. Phone Encounter #  
 9/25/2018  9:45 AM Savana Nolan MD [facility] [phone] [id number] Your Appointments 3/13/2019 11:00 AM  
ESTABLISHED PATIENT with Savana Nolan MD  
[facility] [address] [place] [address]) Appt Note: 6 month f/u  
 [address] [facility]  
[phone] [address] [facility] Upcoming Health Maintenance Date Due Hepatitis C Screening 1945 FOOT EXAM Q1 9/18/1957 MICROALBUMIN Q1 9/18/1957 EYE EXAM RETINAL OR DILATED Q1 9/18/1957 DTaP/Tdap/Td series (1 - Tdap) 9/18/1968 Shingrix Vaccine Age 50> (1 of 2) 9/18/1997 FOBT Q 1 YEAR AGE 50-75 9/18/1997 HEMOGLOBIN A1C Q6M 6/27/2011 LIPID PANEL Q1 12/28/2011 GLAUCOMA SCREENING Q2Y 9/18/2012 Pneumococcal 65+ Low/Medium Risk (1 of 2 - PCV13) 9/18/2012 Influenza Age 5 to Adult 8/1/2018 Allergies as of 9/25/2018  Review Complete On: 9/25/2018 By: Savanna Lira LPN No Known Allergies Current Immunizations  Never Reviewed No immunizations on file. Not reviewed this visit You Were Diagnosed With   
  
 Codes Comments PAF (paroxysmal atrial fibrillation) ([facility]    -  Primary ICD-10-CM: I48.0 ICD-9-CM: 427.31 Essential hypertension, benign     ICD-10-CM: I10 
ICD-9-CM: 312. 1 Chronic systolic heart failure (HCC)     ICD-10-CM: I50.22 ICD-9-CM: 428.22 Mixed hyperlipidemia     ICD-10-CM: E78.2 ICD-9-CM: 272.2   
 NATASHA (obstructive sleep apnea)     ICD-10-CM: G47.33 
ICD-9-CM: 327.23 Vitals BP Pulse Resp Height(growth percentile) Weight(growth percentile) SpO2 130/70 (BP Patient Position: Sitting) (!) 40 16 5' 8\" (1.727 m) 273 lb 1.6 oz (123.9 kg) 97% BMI Smoking Status 41.52 kg/m2 Never Smoker BMI and BSA Data Body Mass Index Body Surface Area 41.52 kg/m 2 2.44 m 2 Preferred Pharmacy Pharmacy Name Phone [facility] [phone] [facility] [address], [address] [place] [address] [phone] Your Updated Medication List  
  
   
This list is accurate as of 9/25/18 10:45 AM.  Always use your most recent med list. amLODIPine 10 mg tablet Commonly known as:  Baldemar Alstrom Take  by mouth daily. carvedilol 6.25 mg tablet Commonly known as:  COREG  
TAKE 1 TABLET BY MOUTH TWICE DAILY  
  
 cpap machine kit  
by Does Not Apply route nightly. ELIQUIS 5 mg tablet Generic drug:  apixaban TAKE 1 TABLET BY MOUTH TWICE DAILY. STOP COUMADIN  
  
 hydroCHLOROthiazide 25 mg tablet Commonly known as:  HYDRODIURIL Take 25 mg by mouth daily. KLOR-CON 10 10 mEq tablet Generic drug:  potassium chloride SR Take 20 mEq by mouth daily. lisinopril 40 mg tablet Commonly known as:  Elida Loss Take 40 mg by mouth daily. metFORMIN 500 mg tablet Commonly known as:  GLUCOPHAGE Take 500 mg by mouth two (2) times daily (with meals). simvastatin 20 mg tablet Commonly known as:  ZOCOR Take  by mouth nightly. [facility] ASPIRIN 81 mg tablet Generic drug:  aspirin delayed-release Take  by mouth daily. We Performed the Following AMB POC EKG ROUTINE W/ 12 LEADS, INTER & REP [99801 CPT(R)] Introducing [facility] & HEALTH SERVICES! [facility] introduces [employer] patient portal. Now you can access parts of your medical record, email your doctor's office, and request medication refills online. 1. In your internet browser, go to [email or web address] 2. Click on the First Time User? Click Here link in the Sign In box. You will see the New Member Sign Up page. 3. Enter your SquareKey Access Code exactly as it appears below. You will not need to use this code after youve completed the sign-up process. If you do not sign up before the expiration date, you must request a new code. · SquareKey Access Code: [email or web address] Expires: 12/24/2018  9:58 AM 
 
4. Enter the last four digits of your Social Security Number (xxxx) and Date of Birth (mm/dd/yyyy) as indicated and click Submit. You will be taken to the next sign-up page. 5. Create a SquareKey ID. This will be your SquareKey login ID and cannot be changed, so think of one that is secure and easy to remember. 6. Create a SquareKey password. You can change your password at any time. 7. Enter your Password Reset Question and Answer. This can be used at a later time if you forget your password. 8. Enter your e-mail address. You will receive e-mail notification when new information is available in [address] 9. Click Sign Up. You can now view and download portions of your medical record. 10. Click the Download Summary menu link to download a portable copy of your medical information. If you have questions, please visit the Frequently Asked Questions section of the SquareKey website. Remember, SquareKey is NOT to be used for urgent needs. For medical emergencies, dial 911. Now available from your iPhone and Android! Please provide this summary of care documentation to your next provider. Your primary care clinician is listed as Sunshine Markleton. If you have any questions after today's visit, please call [phone].

## 2018-09-25 NOTE — PROGRESS NOTES
[address] [address]  [phone]     Subjective: Андрей Thrasher is a 72 y.o. male is here for routine f/u. The patient denies chest pain/ shortness of breath, orthopnea, PND, LE edema, palpitations, syncope, or presyncope. Doing well.     Patient Active Problem List    Diagnosis Date Noted    Obesity, morbid ([address] 03/21/2018    PAF (paroxysmal atrial fibrillation) (HCC)     Atrial fibrillation ([address] 11/14/2012    Essential hypertension, benign 05/02/2012    Chronic systolic heart failure ([address] 05/02/2012    Type II or unspecified type diabetes mellitus without mention of complication, not stated as uncontrolled 05/02/2012    Obesity, unspecified 05/02/2012    Status post total knee replacement 12/26/2010    Aseptic loosening of prosthetic knee ([address] 12/26/2010    DM type 2 (diabetes mellitus, type 2) ([address] 12/26/2010    Hypercholesteremia 12/26/2010    HTN (hypertension) 12/26/2010    Cardiomyopathy ([address] 12/26/2010    Sleep apnea 12/26/2010      Taco Tinsley MD  Past Medical History:   Diagnosis Date    Arrhythmia     a. fib    Cardiomegaly     Cardiomyopathy in other diseases classified elsewhere     Diabetes ([address]     BORDERLINE    Difficult intubation     Hypertension     Other acute and subacute form of ischemic heart disease     Other ill-defined conditions(799.89)     ATHEROSCLEROSIS/HYPERLIPIDEMIA    PAF (paroxysmal atrial fibrillation) (HCC)     Shortness of breath     Unspecified sleep apnea     USES C-PAP/pt dosent know dr name      Past Surgical History:   Procedure Laterality Date    ABDOMEN SURGERY PROC UNLISTED      LEFT INGUINAL HERNIA    COLONOSCOPY N/A 1/13/2017    COLONOSCOPY performed by Rudean Aase, MD at [facility]  1/13/2017         HX GI      COLONOSCOPY    HX ORTHOPAEDIC  2006    BILAT TKR    LA COLSC FLX W/REMOVAL LESION BY HOT BX FORCEPS  5/14/2014          No Known Allergies   Family History   Problem Relation Age of Onset    Heart Disease Mother      w/pacemaker    Diabetes Father     Diabetes Sister       Social History     Social History    Marital status:      Spouse name: N/A    Number of children: N/A    Years of education: N/A     Occupational History    Not on file. Social History Main Topics    Smoking status: Never Smoker    Smokeless tobacco: Never Used    Alcohol use No    Drug use: No    Sexual activity: Not on file     Other Topics Concern    Not on file     Social History Narrative      Current Outpatient Prescriptions   Medication Sig    metFORMIN (GLUCOPHAGE) 500 mg tablet Take 500 mg by mouth two (2) times daily (with meals).  ELIQUIS 5 mg tablet TAKE 1 TABLET BY MOUTH TWICE DAILY. STOP COUMADIN    carvedilol (COREG) 6.25 mg tablet TAKE 1 TABLET BY MOUTH TWICE DAILY    cpap machine kit by Does Not Apply route nightly.  simvastatin (ZOCOR) 20 mg tablet Take  by mouth nightly.  potassium chloride SR (KLOR-CON 10) 10 mEq tablet Take 20 mEq by mouth daily.  aspirin delayed-release (ST. ALEXANDER ASPIRIN) 81 mg tablet Take  by mouth daily.  lisinopril (PRINIVIL, ZESTRIL) 40 mg tablet Take 40 mg by mouth daily.  amlodipine (NORVASC) 10 mg tablet Take  by mouth daily.  hydrochlorothiazide (HYDRODIURIL) 25 mg tablet Take 25 mg by mouth daily. No current facility-administered medications for this visit. Review of Symptoms:  11 systems reviewed, negative other than as stated in the HPI    Physical ExamPhysical Exam:    There were no vitals filed for this visit. There is no height or weight on file to calculate BMI. General PE   Gen:  NAD  Mental Status - Alert. General Appearance - Not in acute distress. Chest and Lung Exam   Inspection: Accessory muscles - No use of accessory muscles in breathing.    Auscultation:   Breath sounds: - Normal.   Cardiovascular   Inspection: Jugular vein - Bilateral - Inspection Normal.   Palpation/Percussion:   Apical Impulse: - Normal.   Auscultation: Rhythm - Regular. Heart Sounds - S1 WNL and S2 WNL. No S3 or S4. Murmurs & Other Heart Sounds: Auscultation of the heart reveals - No Murmurs. Peripheral Vascular   Upper Extremity: Inspection - Bilateral - No Cyanotic nailbeds or Digital clubbing. Lower Extremity:   Palpation: Edema - Bilateral - No edema. Abdomen:   Soft, non-tender, bowel sounds are active. Neuro: A&O times 3, CN and motor grossly WNL    Labs:   Lab Results   Component Value Date/Time    Cholesterol, total 154 12/28/2010 03:10 AM    HDL Cholesterol 33 12/28/2010 03:10 AM    LDL, calculated 110.8 (H) 12/28/2010 03:10 AM    Triglyceride 51 12/28/2010 03:10 AM    CHOL/HDL Ratio 4.7 12/28/2010 03:10 AM     Lab Results   Component Value Date/Time     (H) 12/27/2010 05:00 PM     Lab Results   Component Value Date/Time    Sodium 142 06/29/2015 12:07 PM    Potassium 4.1 06/29/2015 12:07 PM    Chloride 101 06/29/2015 12:07 PM    CO2 26 06/29/2015 12:07 PM    Anion gap 11 12/28/2010 03:10 AM    Glucose 100 (H) 06/29/2015 12:07 PM    BUN 12 06/29/2015 12:07 PM    Creatinine 0.68 (L) 06/29/2015 12:07 PM    BUN/Creatinine ratio 18 06/29/2015 12:07 PM    GFR est  06/29/2015 12:07 PM    GFR est non-AA 99 06/29/2015 12:07 PM    Calcium 8.9 06/29/2015 12:07 PM    Bilirubin, total 0.5 12/27/2010 05:00 PM    AST (SGOT) 23 12/27/2010 05:00 PM    Alk. phosphatase 57 12/27/2010 05:00 PM    Protein, total 7.4 12/27/2010 05:00 PM    Albumin 3.2 (L) 12/27/2010 05:00 PM    Globulin 4.2 (H) 12/27/2010 05:00 PM    A-G Ratio 0.8 (L) 12/27/2010 05:00 PM    ALT (SGPT) 25 12/27/2010 05:00 PM       EKG:  SB, PAC     Assessment:     Assessment:      1. PAF (paroxysmal atrial fibrillation) ([facility]    2. Essential hypertension, benign    3. Chronic systolic heart failure ([facility]    4.  Mixed hyperlipidemia        Orders Placed This Encounter    AMB POC EKG ROUTINE W/ 12 LEADS, INTER & REP     Order Specific Question:   Reason for Exam:     Answer:   routine    metFORMIN (GLUCOPHAGE) 500 mg tablet     Sig: Take 500 mg by mouth two (2) times daily (with meals). Plan:     Pt presents for f/u, doing well and stable from cardiac standpoint. Systolic heart failure  64/90 Echo showed improved EF 55-60%. Walks x 30 minutes 2-3 times a week, tolerating well  Clinically compensated. Wt stable at 273. Continue Coreg, Lisinopril.     HTN: Well controlled. Continue Amlodipine, Coreg, Lisinopril, HCTZ     HLD: Continue Simvastatin. Lipids and labs followed by PCP.     PAF: Denies any palpitation. Maintaining SR. On Eliquis for anticoagulation; tolerating well with no overt bleeding.     NATASHA: Compliant with CPAP therapy       Continue current care and f/u in 6 months. Rafa Hdz NP       [facility]    9/26/2018         Patient seen, examined by me personally. Plan discussed as detailed. Agree with note as outlined by  NP. I confirm findings in history and physical exam. No additional findings noted. Agree with plan as outlined above.      Shazia Tubbs MD

## 2018-09-25 NOTE — TELEPHONE ENCOUNTER
Patient came into the office to have a supply order faxed to [employer]. Please put an order in the system.

## 2018-09-25 NOTE — TELEPHONE ENCOUNTER
Orders Placed This Encounter    AMB SUPPLY ORDER     Diagnosis: (G47.33) NATASHA (obstructive sleep apnea)  (primary encounter diagnosis)     Replacement Supplies for Positive Airway Pressure Therapy Device:   Duration of need: 99 months.  Oral/Nasal Combo Mask 1 every 3 months.  Oral Cushion Combo Mask (Replace) 2 per month.  Nasal Pillows Combo Mask (Replace) 2 per month.  Full Face Mask 1 every 3 months.  Full Face Mask Cushion 1 per month.  Nasal Cushion (Replace) 2 per month.  Nasal Pillows (Replace) 2 per month.  Nasal Interface Mask 1 every 3 months.  Headgear 1 every 6 months.  Chinstrap 1 every 6 months.  Tubing 1 every 3 months.  Filter(s) Disposable 2 per month.  Filter(s) Non-Disposable 1 every 6 months.  Oral Interface 1 every 3 months. [address] for [employer] (Replace) 1 every 6 months.  Tubing with heating element 1 every 3 months. Perform Mask Fitting per patient preference and comfort - replace as above. Jeffrey Lei MD, FAA; NPI: [id number]    Electronically signed.  Date:- 09/25/18

## 2018-09-25 NOTE — PROGRESS NOTES
1. Have you been to the ER, urgent care clinic since your last visit? Hospitalized since your last visit? No    2. Have you seen or consulted any other health care providers outside of the [address] [place] [address] since your last visit? Include any pap smears or colon screening. Yes Eye Exam & PCP    Patient has no cardiac complaints.

## 2018-09-26 ENCOUNTER — DOCUMENTATION ONLY (OUTPATIENT)
Dept: SLEEP MEDICINE | Age: 73
End: 2018-09-26

## 2018-10-26 ENCOUNTER — TELEPHONE (OUTPATIENT)
Dept: CARDIOLOGY CLINIC | Age: 73
End: 2018-10-26

## 2018-10-26 NOTE — TELEPHONE ENCOUNTER
Faxed note to Dr. [facility] office @ [phone] stating pt is cleared for a colonoscopy, may hold Eliquis 48 hours before procedure, resume evening of procedure.

## 2018-10-26 NOTE — TELEPHONE ENCOUNTER
Pt is having colonoscopy done 12/21/18, can the patient hold his Eliquis for 3 days prior to the procedure.      Please fax information to: [phone]

## 2018-12-21 ENCOUNTER — ANESTHESIA (OUTPATIENT)
Dept: ENDOSCOPY | Age: 73
End: 2018-12-21
Payer: COMMERCIAL

## 2018-12-21 ENCOUNTER — ANESTHESIA EVENT (OUTPATIENT)
Dept: ENDOSCOPY | Age: 73
End: 2018-12-21
Payer: COMMERCIAL

## 2018-12-21 ENCOUNTER — HOSPITAL ENCOUNTER (OUTPATIENT)
Age: 73
Setting detail: OUTPATIENT SURGERY
Discharge: HOME | End: 2018-12-21
Attending: INTERNAL MEDICINE | Admitting: INTERNAL MEDICINE
Payer: COMMERCIAL

## 2018-12-21 VITALS
BODY MASS INDEX: 40.77 KG/M2 | WEIGHT: 269 LBS | SYSTOLIC BLOOD PRESSURE: 136 MMHG | DIASTOLIC BLOOD PRESSURE: 73 MMHG | HEART RATE: 50 BPM | OXYGEN SATURATION: 98 % | RESPIRATION RATE: 16 BRPM | HEIGHT: 68 IN | TEMPERATURE: 97.9 F

## 2018-12-21 LAB
GLUCOSE BLD STRIP.AUTO-MCNC: 99 MG/DL (ref 65–100)
SERVICE CMNT-IMP: NORMAL

## 2018-12-21 PROCEDURE — 74011250636 HC RX REV CODE- 250/636

## 2018-12-21 PROCEDURE — 76060000031 HC ANESTHESIA FIRST 0.5 HR: Performed by: INTERNAL MEDICINE

## 2018-12-21 PROCEDURE — 82962 GLUCOSE BLOOD TEST: CPT

## 2018-12-21 PROCEDURE — 76040000019: Performed by: INTERNAL MEDICINE

## 2018-12-21 PROCEDURE — 74011250636 HC RX REV CODE- 250/636: Performed by: INTERNAL MEDICINE

## 2018-12-21 RX ORDER — SODIUM CHLORIDE 9 MG/ML
75 INJECTION, SOLUTION INTRAVENOUS CONTINUOUS
Status: DISCONTINUED | OUTPATIENT
Start: 2018-12-21 | End: 2018-12-21 | Stop reason: HOSPADM

## 2018-12-21 RX ORDER — NALOXONE HYDROCHLORIDE 0.4 MG/ML
0.4 INJECTION, SOLUTION INTRAMUSCULAR; INTRAVENOUS; SUBCUTANEOUS
Status: DISCONTINUED | OUTPATIENT
Start: 2018-12-21 | End: 2018-12-21 | Stop reason: HOSPADM

## 2018-12-21 RX ORDER — SODIUM CHLORIDE 0.9 % (FLUSH) 0.9 %
5-10 SYRINGE (ML) INJECTION AS NEEDED
Status: DISCONTINUED | OUTPATIENT
Start: 2018-12-21 | End: 2018-12-21 | Stop reason: HOSPADM

## 2018-12-21 RX ORDER — MIDAZOLAM HYDROCHLORIDE 1 MG/ML
.25-5 INJECTION, SOLUTION INTRAMUSCULAR; INTRAVENOUS
Status: DISCONTINUED | OUTPATIENT
Start: 2018-12-21 | End: 2018-12-21 | Stop reason: HOSPADM

## 2018-12-21 RX ORDER — EPINEPHRINE 0.1 MG/ML
1 INJECTION INTRACARDIAC; INTRAVENOUS
Status: DISCONTINUED | OUTPATIENT
Start: 2018-12-21 | End: 2018-12-21 | Stop reason: HOSPADM

## 2018-12-21 RX ORDER — FLUMAZENIL 0.1 MG/ML
0.2 INJECTION INTRAVENOUS
Status: DISCONTINUED | OUTPATIENT
Start: 2018-12-21 | End: 2018-12-21 | Stop reason: HOSPADM

## 2018-12-21 RX ORDER — SODIUM CHLORIDE 0.9 % (FLUSH) 0.9 %
5-10 SYRINGE (ML) INJECTION EVERY 8 HOURS
Status: DISCONTINUED | OUTPATIENT
Start: 2018-12-21 | End: 2018-12-21 | Stop reason: HOSPADM

## 2018-12-21 RX ORDER — PROPOFOL 10 MG/ML
INJECTION, EMULSION INTRAVENOUS AS NEEDED
Status: DISCONTINUED | OUTPATIENT
Start: 2018-12-21 | End: 2018-12-21 | Stop reason: HOSPADM

## 2018-12-21 RX ORDER — DEXTROMETHORPHAN/PSEUDOEPHED 2.5-7.5/.8
1.2 DROPS ORAL
Status: DISCONTINUED | OUTPATIENT
Start: 2018-12-21 | End: 2018-12-21 | Stop reason: HOSPADM

## 2018-12-21 RX ORDER — LIDOCAINE HYDROCHLORIDE 20 MG/ML
INJECTION, SOLUTION EPIDURAL; INFILTRATION; INTRACAUDAL; PERINEURAL AS NEEDED
Status: DISCONTINUED | OUTPATIENT
Start: 2018-12-21 | End: 2018-12-21 | Stop reason: HOSPADM

## 2018-12-21 RX ORDER — FENTANYL CITRATE 50 UG/ML
50 INJECTION, SOLUTION INTRAMUSCULAR; INTRAVENOUS
Status: DISCONTINUED | OUTPATIENT
Start: 2018-12-21 | End: 2018-12-21 | Stop reason: HOSPADM

## 2018-12-21 RX ORDER — ATROPINE SULFATE 0.1 MG/ML
0.5 INJECTION INTRAVENOUS
Status: DISCONTINUED | OUTPATIENT
Start: 2018-12-21 | End: 2018-12-21 | Stop reason: HOSPADM

## 2018-12-21 RX ADMIN — LIDOCAINE HYDROCHLORIDE 40 MG: 20 INJECTION, SOLUTION EPIDURAL; INFILTRATION; INTRACAUDAL; PERINEURAL at 13:04

## 2018-12-21 RX ADMIN — PROPOFOL 140 MG: 10 INJECTION, EMULSION INTRAVENOUS at 13:15

## 2018-12-21 RX ADMIN — SODIUM CHLORIDE 75 ML/HR: 900 INJECTION, SOLUTION INTRAVENOUS at 13:03

## 2018-12-21 NOTE — PROGRESS NOTES
[facility]  1945  [id number]    Situation:  Verbal report received from: brandon bailon rn  Procedure: Procedure(s):  COLONOSCOPY    Background:    Preoperative diagnosis: HISTORY COLONIC POLYPS  Postoperative diagnosis: Colon: Diverticulosis, internal hemrorrhoids    :  Dr. Den Hood  Assistant(s): Endoscopy Technician-1: Anthony Escobar  Endoscopy RN-1: Christine Seo RN; Davida Baldwin RN    Specimens: * No specimens in log *  H. Pylori  no    Assessment:  Intra-procedure medications     Anesthesia gave intra-procedure sedation and medications, see anesthesia flow sheet yes    Intravenous fluids: NS@ KVO     Vital signs stable  yes    Abdominal assessment: round and soft  yes    Recommendation:  Discharge patient per MD order yes.   Family or Friend  yes  Permission to share finding with family or friend yes

## 2018-12-21 NOTE — PROCEDURES
NAME:  Keith Anaya   :      MRN:   [id number]     Date/Time:  2018 1:22 PM    Colonoscopy Operative Report    Procedure Type:   Colonoscopy --screening     Indications:     Personal history of colon polyps (screening only)  Pre-operative Diagnosis: see indication above  Post-operative Diagnosis:  See findings below  :  Mitra Elias MD  Referring Provider: --Cori Bergman MD    Exam:  Airway: clear, no airway problems anticipated  Heart: RRR, without gallops or rubs  Lungs: clear bilaterally without wheezes, crackles, or rhonchi  Abdomen: soft, nontender, nondistended, bowel sounds present  Mental Status: awake, alert and oriented to person, place and time    Sedation:  MAC anesthesia Propofol  Procedure Details:  After informed consent was obtained with all risks and benefits of procedure explained and preoperative exam completed, the patient was taken to the endoscopy suite and placed in the left lateral decubitus position. Upon sequential sedation as per above, a digital rectal exam was performed demonstrating internal hemorrhoids. The Olympus videocolonoscope  was inserted in the rectum and carefully advanced to the cecum, which was identified by the ileocecal valve and appendiceal orifice. The quality of preparation was fair. The colonoscope was slowly withdrawn with careful evaluation between folds. Retroflexion in the rectum was completed demonstrating internal hemorrhoids. Findings:   1. Mild sigmoid diverticulosis  2. Large internal hemorrhoids seen on retroflexion  3. Otherwise normal colonoscopy through to the cecum    Specimen Removed:  None  Complications: None. EBL:  None. Impression:    1. Mild sigmoid diverticulosis  2. Large internal hemorrhoids seen on retroflexion  3. Otherwise normal colonoscopy through to the cecum    Recommendations:   1.  Repeat colonoscopy in 3 years given suboptimal bowel preparation and history of an advanced adenoma    Discharge Disposition:  Home in the company of a  when able to ambulate.       Elena Arguello MD

## 2018-12-21 NOTE — DISCHARGE INSTRUCTIONS
Mendel Colon  [id number]  1945    COLON DISCHARGE INSTRUCTIONS  Discomfort:  Redness at IV site- apply warm compress to area; if redness or soreness persist- contact your physician  There may be a slight amount of blood passed from the rectum  Gaseous discomfort- walking, belching will help relieve any discomfort  You may not operate a vehicle for 12 hours  You may not engage in an occupation involving machinery or appliances for rest of today  You may not drink alcoholic beverages for at least 12 hours  Avoid making any critical decisions for at least 24 hour  DIET:   Regular diet. - however -  remember your colon is empty and a heavy meal will produce gas. Avoid these foods:  vegetables, fried / greasy foods, carbonated drinks for today  MEDICATION:  Per Medication Reconciliation  Resume Eliquis tomorrow       ACTIVITY:  You may not resume your normal daily activities until tomorrow AM; it is recommended that you spend the remainder of the day resting -  avoid any strenuous activity. CALL M.D. ANY SIGN OF:   Increasing pain, nausea, vomiting  Abdominal distension (swelling)  New increased bleeding (oral or rectal)  Fever (chills)    You may not  take any Advil,  Ibuprofen, Motrin, Aleve, or Goodys for 10 days, ONLY  Tylenol as needed for pain. IMPRESSION:  Impression:    1. Mild sigmoid diverticulosis  2. Large internal hemorrhoids seen on retroflexion  3. Otherwise normal colonoscopy through to the cecum    Recommendations:   1.  Repeat colonoscopy in 3 years given suboptimal bowel preparation and history of an advanced adenoma    Follow-up Instructions:  Telephone # [phone]    Jarek Vora MD

## 2018-12-21 NOTE — PROGRESS NOTES
Anesthesia reports 140mg Propofol, 40mg Lidocaine and 300mL NS given during procedure. Received report from anesthesia staff on vital signs and status of patient. Endoscope was pre-cleaned at the bedside immediately following procedure by [facility].

## 2018-12-21 NOTE — ANESTHESIA POSTPROCEDURE EVALUATION
Procedure(s):  COLONOSCOPY. Anesthesia Post Evaluation        Patient location during evaluation: PACU  Note status: Adequate. Level of consciousness: responsive to verbal stimuli and sleepy but conscious  Pain management: satisfactory to patient  Airway patency: patent  Anesthetic complications: no  Cardiovascular status: acceptable  Respiratory status: acceptable  Hydration status: acceptable  Comments: +Post-Anesthesia Evaluation and Assessment    Patient: Kerline Mejía MRN: [id number]  SSN: [id number]   YOB: 1945  Age: 72 y.o. Sex: male      Cardiovascular Function/Vital Signs    /61   Pulse 68   Temp 36.6 °C (97.9 °F)   Resp 15   Ht 5' 8\" (1.727 m)   Wt 122 kg (269 lb)   SpO2 100%   BMI 40.90 kg/m²     Patient is status post Procedure(s):  COLONOSCOPY. Nausea/Vomiting: Controlled. Postoperative hydration reviewed and adequate. Pain:  Pain Scale 1: Numeric (0 - 10) (12/21/18 1335)  Pain Intensity 1: 0 (12/21/18 1335)   Managed. Neurological Status: At baseline. Mental Status and Level of Consciousness: Arousable. Pulmonary Status:   O2 Device: Room air (12/21/18 1335)   Adequate oxygenation and airway patent. Complications related to anesthesia: None    Post-anesthesia assessment completed. No concerns.     Signed By: Rogelio Eden MD    12/21/2018  Post anesthesia nausea and vomiting:  controlled      Visit Vitals  /61   Pulse 68   Temp 36.6 °C (97.9 °F)   Resp 15   Ht 5' 8\" (1.727 m)   Wt 122 kg (269 lb)   SpO2 100%   BMI 40.90 kg/m²

## 2018-12-21 NOTE — ANESTHESIA PREPROCEDURE EVALUATION
Anesthetic History     Increased risk of difficult airway          Review of Systems / Medical History  Patient summary reviewed, nursing notes reviewed and pertinent labs reviewed    Pulmonary        Sleep apnea: CPAP           Neuro/Psych   Within defined limits           Cardiovascular    Hypertension        Dysrhythmias : atrial fibrillation  CAD    Exercise tolerance: >4 METS  Comments: EKG: Marked sinus  Bradycardia  -Short AK syndrome  - occasional PAC  EF 55-60%   Hx PAF   GI/Hepatic/Renal  Within defined limits              Endo/Other    Diabetes    Morbid obesity and arthritis     Other Findings        Anesthetic History     Increased risk of difficult airway          Review of Systems / Medical History  Patient summary reviewed, nursing notes reviewed and pertinent labs reviewed    Pulmonary        Sleep apnea: CPAP           Neuro/Psych   Within defined limits           Cardiovascular    Hypertension        Dysrhythmias : atrial fibrillation  CAD    Exercise tolerance: >4 METS  Comments: Echo last year showed normal EF and normal valves   GI/Hepatic/Renal  Within defined limits              Endo/Other    Diabetes    Morbid obesity     Other Findings            Physical Exam    Airway  Mallampati: III  TM Distance: 4 - 6 cm  Neck ROM: decreased range of motion   Mouth opening: Normal     Cardiovascular  Regular rate and rhythm,  S1 and S2 normal,  no murmur, click, rub, or gallop  Rhythm: regular  Rate: normal         Dental  No notable dental hx       Pulmonary  Breath sounds clear to auscultation               Abdominal  GI exam deferred       Other Findings            Anesthetic Plan    ASA: 3  Anesthesia type: MAC            Anesthetic plan and risks discussed with: Patient              Physical Exam    Airway  Mallampati: III  TM Distance: 4 - 6 cm  Neck ROM: decreased range of motion   Mouth opening: Normal     Cardiovascular  Regular rate and rhythm,  S1 and S2 normal,  no murmur, click, rub, or gallop  Rhythm: regular  Rate: normal         Dental    Dentition: Lower partial plate     Pulmonary  Breath sounds clear to auscultation               Abdominal  GI exam deferred       Other Findings            Anesthetic Plan    ASA: 3  Anesthesia type: total IV anesthesia and general            Anesthetic plan and risks discussed with: Patient      Propofol MAC/Supernova

## 2019-03-13 ENCOUNTER — OFFICE VISIT (OUTPATIENT)
Dept: CARDIOLOGY CLINIC | Age: 74
End: 2019-03-13

## 2019-03-13 VITALS
RESPIRATION RATE: 16 BRPM | SYSTOLIC BLOOD PRESSURE: 136 MMHG | OXYGEN SATURATION: 97 % | WEIGHT: 269 LBS | DIASTOLIC BLOOD PRESSURE: 74 MMHG | BODY MASS INDEX: 40.77 KG/M2 | HEIGHT: 68 IN | HEART RATE: 51 BPM

## 2019-03-13 DIAGNOSIS — I48.0 PAROXYSMAL ATRIAL FIBRILLATION (HCC): Primary | ICD-10-CM

## 2019-03-13 DIAGNOSIS — I10 ESSENTIAL HYPERTENSION, BENIGN: ICD-10-CM

## 2019-03-13 DIAGNOSIS — I50.22 CHRONIC SYSTOLIC HEART FAILURE (HCC): ICD-10-CM

## 2019-03-13 DIAGNOSIS — G47.33 OSA (OBSTRUCTIVE SLEEP APNEA): ICD-10-CM

## 2019-03-13 DIAGNOSIS — E78.2 MIXED HYPERLIPIDEMIA: ICD-10-CM

## 2019-03-13 DIAGNOSIS — E66.01 OBESITY, MORBID (HCC): ICD-10-CM

## 2019-03-13 NOTE — PROGRESS NOTES
1. Have you been to the ER, urgent care clinic since your last visit? Hospitalized since your last visit? NO    2. Have you seen or consulted any other health care providers outside of the [address] [place] [address] since your last visit? Include any pap smears or colon screening. NO    6 MONTH FOLLOW UP. NO CARDIAC C/O.

## 2019-03-13 NOTE — PROGRESS NOTES
[address] [facility], Rhiannon Gregorio, [address]  [phone]     Subjective: Han Dean is a 72 y.o. male is here for routine f/u. The patient denies chest pain/ shortness of breath, orthopnea, PND, LE edema, palpitations, syncope, or presyncope.        Patient Active Problem List    Diagnosis Date Noted    Obesity, morbid ([address] 03/21/2018    PAF (paroxysmal atrial fibrillation) (HCC)     Atrial fibrillation ([address] 11/14/2012    Essential hypertension, benign 05/02/2012    Chronic systolic heart failure ([address] 05/02/2012    Type II or unspecified type diabetes mellitus without mention of complication, not stated as uncontrolled 05/02/2012    Obesity, unspecified 05/02/2012    Status post total knee replacement 12/26/2010    Aseptic loosening of prosthetic knee ([address] 12/26/2010    DM type 2 (diabetes mellitus, type 2) ([address] 12/26/2010    Hypercholesteremia 12/26/2010    HTN (hypertension) 12/26/2010    Cardiomyopathy ([address] 12/26/2010    Sleep apnea 12/26/2010      Carlos Littlejohn MD  Past Medical History:   Diagnosis Date    Adverse effect of anesthesia     slow to wake    Arrhythmia     a. fib    Arthritis     hands    Cardiomegaly     Cardiomyopathy in other diseases classified elsewhere     Diabetes ([address]     BORDERLINE    Difficult intubation     Hypertension     Other acute and subacute form of ischemic heart disease     Other ill-defined conditions(799.89)     ATHEROSCLEROSIS/HYPERLIPIDEMIA    PAF (paroxysmal atrial fibrillation) (HCC)     Shortness of breath     Unspecified sleep apnea     USES C-PAP/pt dosent know dr name      Past Surgical History:   Procedure Laterality Date    COLONOSCOPY N/A 1/13/2017    COLONOSCOPY performed by Vin Kang MD at [facility] ENDOSCOPY    COLONOSCOPY N/A 12/21/2018    COLONOSCOPY performed by Arnaud Holland MD at [address] [facility]  1/13/2017         HX GI      COLONOSCOPY    HX HERNIA REPAIR Left     LEFT INGUINAL HERNIA    HX KNEE REPLACEMENT Bilateral 2006    BILAT TKR    OR COLSC FLX W/REMOVAL LESION BY HOT BX FORCEPS  5/14/2014          No Known Allergies   Family History   Problem Relation Age of Onset    Heart Disease Mother         w/pacemaker    Diabetes Father     Diabetes Sister       Social History     Socioeconomic History    Marital status:      Spouse name: Not on file    Number of children: Not on file    Years of education: Not on file    Highest education level: Not on file   Social Needs    Financial resource strain: Not on file    Food insecurity - worry: Not on file    Food insecurity - inability: Not on file   [employer] needs - medical: Not on file   [employer] needs - non-medical: Not on file   Occupational History    Not on file   Tobacco Use    Smoking status: Never Smoker    Smokeless tobacco: Never Used   Substance and Sexual Activity    Alcohol use: No     Alcohol/week: 0.0 oz    Drug use: No    Sexual activity: Not on file   Other Topics Concern    Not on file   Social History Narrative    Not on file      Current Outpatient Medications   Medication Sig    carvedilol (COREG) 6.25 mg tablet TAKE 1 TABLET BY MOUTH TWICE DAILY    metFORMIN (GLUCOPHAGE) 500 mg tablet Take 500 mg by mouth two (2) times daily (with meals).  ELIQUIS 5 mg tablet TAKE 1 TABLET BY MOUTH TWICE DAILY. STOP COUMADIN    cpap machine kit by Does Not Apply route nightly.  simvastatin (ZOCOR) 20 mg tablet Take  by mouth nightly.  potassium chloride SR (KLOR-CON 10) 10 mEq tablet Take 20 mEq by mouth daily.  aspirin delayed-release (ST. ALEXANDER ASPIRIN) 81 mg tablet Take  by mouth daily.  lisinopril (PRINIVIL, ZESTRIL) 40 mg tablet Take 40 mg by mouth daily.  amlodipine (NORVASC) 10 mg tablet Take  by mouth daily.  hydrochlorothiazide (HYDRODIURIL) 25 mg tablet Take 25 mg by mouth daily.      No current facility-administered medications for this visit. Review of Symptoms:  11 systems reviewed, negative other than as stated in the HPI    Physical ExamPhysical Exam:    Vitals:    03/13/19 1110   BP: 148/70   Pulse: (!) 51   Resp: 16   SpO2: 97%   Weight: 269 lb (122 kg)   Height: 5' 8\" (1.727 m)     Body mass index is 40.9 kg/m². General PE   Gen:  NAD  Mental Status - Alert. General Appearance - Not in acute distress. Chest and Lung Exam   Inspection: Accessory muscles - No use of accessory muscles in breathing. Auscultation:   Breath sounds: - Normal.   Cardiovascular   Inspection: Jugular vein - Bilateral - Inspection Normal.   Palpation/Percussion:   Apical Impulse: - Normal.   Auscultation: Rhythm - Regular. Heart Sounds - S1 WNL and S2 WNL. No S3 or S4. Murmurs & Other Heart Sounds: Auscultation of the heart reveals - No Murmurs. Peripheral Vascular   Upper Extremity: Inspection - Bilateral - No Cyanotic nailbeds or Digital clubbing. Lower Extremity:   Palpation: Edema - Bilateral - No edema. Abdomen:   Soft, non-tender, bowel sounds are active.   Neuro: A&O times 3, CN and motor grossly WNL    Labs:   Lab Results   Component Value Date/Time    Cholesterol, total 154 12/28/2010 03:10 AM    HDL Cholesterol 33 12/28/2010 03:10 AM    LDL, calculated 110.8 (H) 12/28/2010 03:10 AM    Triglyceride 51 12/28/2010 03:10 AM    CHOL/HDL Ratio 4.7 12/28/2010 03:10 AM     Lab Results   Component Value Date/Time     (H) 12/27/2010 05:00 PM     Lab Results   Component Value Date/Time    Sodium 142 06/29/2015 12:07 PM    Potassium 4.1 06/29/2015 12:07 PM    Chloride 101 06/29/2015 12:07 PM    CO2 26 06/29/2015 12:07 PM    Anion gap 11 12/28/2010 03:10 AM    Glucose 100 (H) 06/29/2015 12:07 PM    BUN 12 06/29/2015 12:07 PM    Creatinine 0.68 (L) 06/29/2015 12:07 PM    BUN/Creatinine ratio 18 06/29/2015 12:07 PM    GFR est  06/29/2015 12:07 PM    GFR est non-AA 99 06/29/2015 12:07 PM    Calcium 8.9 06/29/2015 12:07 PM Bilirubin, total 0.5 12/27/2010 05:00 PM    AST (SGOT) 23 12/27/2010 05:00 PM    Alk. phosphatase 57 12/27/2010 05:00 PM    Protein, total 7.4 12/27/2010 05:00 PM    Albumin 3.2 (L) 12/27/2010 05:00 PM    Globulin 4.2 (H) 12/27/2010 05:00 PM    A-G Ratio 0.8 (L) 12/27/2010 05:00 PM    ALT (SGPT) 25 12/27/2010 05:00 PM       EKG:  SB     Assessment:     Assessment:      1. Paroxysmal atrial fibrillation (HCC)    2. Essential hypertension, benign    3. Chronic systolic heart failure ([facility]    4. Mixed hyperlipidemia    5. NATASHA (obstructive sleep apnea)    6. Obesity, morbid ([facility]        Orders Placed This Encounter    AMB POC EKG ROUTINE W/ 12 LEADS, INTER & REP     Order Specific Question:   Reason for Exam:     Answer:   ROUTINE        Plan:     Patient presents for f/u,  doing well and stable from cardiac standpoint. Walks x 30 minutes 2-3 times a week weather permitting. Systolic heart failure  89/72 Echo showed improved EF 55-60%. Clinically compensated. Wt down 4 lbs. Continue Coreg, Lisinopril.     HTN:   Well controlled. Continue Amlodipine, Coreg, Lisinopril, HCTZ     HLD:   Continue Simvastatin. Lipids and labs followed by PCP.     PAF:   Denies any palpitation. Maintaining SR. On Eliquis for anticoagulation; tolerating well with no overt bleeding.     NATASHA:   Compliant with CPAP therapy     DM  On Metformin    Continue current care and f/u in 6 months       Alessandra Borrero NP       [facility]    3/13/2019         Patient seen, examined by me personally. Plan discussed as detailed. Agree with note as outlined by  NP. I confirm findings in history and physical exam. No additional findings noted. Agree with plan as outlined above.      Calderon Garcia MD

## 2020-02-21 ENCOUNTER — OFFICE VISIT (OUTPATIENT)
Dept: CARDIOLOGY CLINIC | Age: 75
End: 2020-02-21

## 2020-02-21 ENCOUNTER — DOCUMENTATION ONLY (OUTPATIENT)
Dept: CARDIOLOGY CLINIC | Age: 75
End: 2020-02-21

## 2020-02-21 VITALS
HEIGHT: 68 IN | OXYGEN SATURATION: 98 % | SYSTOLIC BLOOD PRESSURE: 142 MMHG | DIASTOLIC BLOOD PRESSURE: 68 MMHG | RESPIRATION RATE: 16 BRPM | WEIGHT: 272.7 LBS | BODY MASS INDEX: 41.33 KG/M2 | HEART RATE: 51 BPM

## 2020-02-21 DIAGNOSIS — I50.22 CHRONIC SYSTOLIC HEART FAILURE (HCC): ICD-10-CM

## 2020-02-21 DIAGNOSIS — I48.0 PAROXYSMAL ATRIAL FIBRILLATION (HCC): Primary | ICD-10-CM

## 2020-02-21 DIAGNOSIS — E78.2 MIXED HYPERLIPIDEMIA: ICD-10-CM

## 2020-02-21 DIAGNOSIS — I10 ESSENTIAL HYPERTENSION, BENIGN: ICD-10-CM

## 2020-02-21 DIAGNOSIS — G47.33 OSA (OBSTRUCTIVE SLEEP APNEA): ICD-10-CM

## 2020-02-21 RX ORDER — SILDENAFIL CITRATE 20 MG/1
20 TABLET ORAL AS NEEDED
COMMUNITY
Start: 2020-01-16

## 2020-02-21 NOTE — PROGRESS NOTES
1. Have you been to the ER, urgent care clinic since your last visit? Hospitalized since your last visit? NO     2. Have you seen or consulted any other health care providers outside of the [facility] since your last visit? Include any pap smears or colon screening. YES, UROLOGIST. CLEARANCE. NO CARDIAC C/O.

## 2020-02-21 NOTE — PROGRESS NOTES
Faxed clearance note with EKG to [facility] @ [phone]. Pt is cleared for procedure, may hold Eliquis 2 days prior and resume the following day.

## 2020-02-25 NOTE — PROGRESS NOTES
NAME:  Kamran Berry   :   [birth year]   MRN:   [id number]   PCP:  Everett Bush MD           Subjective: The patient is a 69y.o. year old male  who returns for a routine follow-up. Since the last visit, patient reports no change in exercise tolerance, chest pain, edema, medication intolerance, palpitations, shortness of breath, PND/orthopnea wheezing, sputum, syncope, dizziness or light headedness. Doing well. Past Medical History:   Diagnosis Date    Adverse effect of anesthesia     slow to wake    Arrhythmia     a. fib    Arthritis     hands    Cardiomegaly     Cardiomyopathy in other diseases classified elsewhere     Diabetes ([facility]     BORDERLINE    Difficult intubation     Hypertension     Other acute and subacute form of ischemic heart disease     Other ill-defined conditions(799.89)     ATHEROSCLEROSIS/HYPERLIPIDEMIA    PAF (paroxysmal atrial fibrillation) (HCC)     Shortness of breath     Unspecified sleep apnea     USES C-PAP/pt dosent know dr name        ICD-10-CM ICD-9-CM    1. Paroxysmal atrial fibrillation (HCC) I48.0 427.31 AMB POC EKG ROUTINE W/ 12 LEADS, INTER & REP   2. Essential hypertension, benign I10 401.1    3. Chronic systolic heart failure (HCC) I50.22 428.22    4. Mixed hyperlipidemia E78.2 272.2    5. NATASHA (obstructive sleep apnea) G47.33 327.23       Social History     Tobacco Use    Smoking status: Never Smoker    Smokeless tobacco: Never Used   Substance Use Topics    Alcohol use: No     Alcohol/week: 0.0 standard drinks      Family History   Problem Relation Age of Onset    Heart Disease Mother         w/pacemaker    Diabetes Father     Diabetes Sister         Review of Systems  General: Pt denies excessive weight gain or loss. Pt is able to conduct ADL's  HEENT: Denies blurred vision, headaches, epistaxis and difficulty swallowing. Respiratory: Denies shortness of breath, SERVIN, wheezing or stridor.   Cardiovascular: Denies precordial pain, palpitations, edema or PND  Gastrointestinal: Denies poor appetite, indigestion, abdominal pain or blood in stool  Musculoskeletal: Denies pain or swelling from muscles or joints  Neurologic: Denies tremor, paresthesias, or sensory motor disturbance  Skin: Denies rash, itching or texture change. Objective:       Vitals:    02/21/20 1057 02/21/20 1106   BP: 150/70 142/68   Pulse: (!) 51    Resp: 16    SpO2: 98%    Weight: 272 lb 11.2 oz (123.7 kg)    Height: 5' 8\" (1.727 m)     Body mass index is 41.46 kg/m². General PE  Mental Status - Alert. General Appearance - Not in acute distress. Chest and Lung Exam   Inspection: Accessory muscles - No use of accessory muscles in breathing. Auscultation:   Breath sounds: - Normal.    Cardiovascular   Inspection: Jugular vein - Bilateral - Inspection Normal.  Palpation/Percussion:   Apical Impulse: - Normal.  Auscultation: Rhythm - Regular. Heart Sounds - S1 WNL and S2 WNL. No S3 or S4. Murmurs & Other Heart Sounds: Auscultation of the heart reveals - No Murmurs. Peripheral Vascular   Upper Extremity: Inspection - Bilateral - No Cyanotic nailbeds or Digital clubbing. Lower Extremity:   Palpation: Edema - Bilateral - No edema. Data Review:     EKG -EKG: normal EKG, normal sinus rhythm, unchanged from previous tracings. Medications reviewed  Current Outpatient Medications   Medication Sig    sildenafil, pulm. hypertension, (REVATIO) 20 mg tablet Take 20 mg by mouth as needed.  carvedilol (COREG) 6.25 mg tablet TAKE 1 TABLET BY MOUTH TWICE DAILY    ELIQUIS 5 mg tablet TAKE 1 TABLET BY MOUTH TWICE DAILY. STOP COUMADIN    metFORMIN (GLUCOPHAGE) 500 mg tablet Take 500 mg by mouth two (2) times daily (with meals).  cpap machine kit by Does Not Apply route nightly.  simvastatin (ZOCOR) 20 mg tablet Take  by mouth nightly.  potassium chloride SR (KLOR-CON 10) 10 mEq tablet Take 20 mEq by mouth daily.     aspirin delayed-release (ST. ALEXANDER ASPIRIN) 81 mg tablet Take  by mouth daily.  lisinopril (PRINIVIL, ZESTRIL) 40 mg tablet Take 40 mg by mouth daily.  amlodipine (NORVASC) 10 mg tablet Take  by mouth daily.  hydrochlorothiazide (HYDRODIURIL) 25 mg tablet Take 25 mg by mouth daily. No current facility-administered medications for this visit. Assessment:       ICD-10-CM ICD-9-CM    1. Paroxysmal atrial fibrillation (HCC) I48.0 427.31 AMB POC EKG ROUTINE W/ 12 LEADS, INTER & REP   2. Essential hypertension, benign I10 401.1    3. Chronic systolic heart failure (HCC) I50.22 428.22    4. Mixed hyperlipidemia E78.2 272.2    5. NATASHA (obstructive sleep apnea) G47.33 327.23         Plan:     Patient presents doing well and stable from cardiac standpoint. Continue current care and follow up in 6 months. Can hold eliquis for 24 hrs for teeth extraction.     Nino Boswell MD

## 2020-04-08 RX ORDER — APIXABAN 5 MG/1
TABLET, FILM COATED ORAL
Qty: 60 TAB | Refills: 12 | Status: SHIPPED | OUTPATIENT
Start: 2020-04-08 | End: 2021-04-28 | Stop reason: SDUPTHER

## 2020-05-19 ENCOUNTER — TELEPHONE (OUTPATIENT)
Dept: CARDIOLOGY CLINIC | Age: 75
End: 2020-05-19

## 2020-05-19 NOTE — TELEPHONE ENCOUNTER
Please call to advise if patient is cleared for prostate ultra sound and biopsy. When can patient come off aspirin and eliquis. Thanks!

## 2020-05-19 NOTE — TELEPHONE ENCOUNTER
Faxed clearance note with last office note and EKG to [facility] @ [phone]. Pt is cleared for procedure, may hold Eliquis and Aspirin 2 days prior and resume the following day.

## 2020-05-28 RX ORDER — CARVEDILOL 6.25 MG/1
TABLET ORAL
Qty: 60 TAB | Refills: 12 | Status: SHIPPED | OUTPATIENT
Start: 2020-05-28 | End: 2021-05-17

## 2020-08-25 NOTE — PROGRESS NOTES
Subjective/HPI:     Rosette Leyden is a 69 y.o. male is here for routine f/u. He has a PMHx of non-ischemic cardiomyopathy now resolved, PAF, DM2, HTN, HLD and NATASHA on CPAP. Last seen by us in 2/2020. He remains in usual state of health. States compliance with medication regimen. Had labs done with PCP in May. PCP Provider  Eunice Alexis MD    Past Medical History:   Diagnosis Date    Adverse effect of anesthesia     slow to wake    Arrhythmia     a. fib    Arthritis     hands    Cardiomegaly     Cardiomyopathy in other diseases classified elsewhere     Diabetes ([facility]     BORDERLINE    Difficult intubation     Hypertension     Other acute and subacute form of ischemic heart disease     Other ill-defined conditions(799.89)     ATHEROSCLEROSIS/HYPERLIPIDEMIA    PAF (paroxysmal atrial fibrillation) (HCC)     Shortness of breath     Unspecified sleep apnea     USES C-PAP/pt dosent know dr name        No Known Allergies     Outpatient Encounter Medications as of 8/26/2020   Medication Sig Dispense Refill    carvediloL (COREG) 6.25 mg tablet TAKE 1 TABLET BY MOUTH TWICE DAILY 60 Tab 12    Eliquis 5 mg tablet TAKE 1 TABLET BY MOUTH TWICE DAILY. STOP COUMADIN 60 Tab 12    sildenafil, pulm. hypertension, (REVATIO) 20 mg tablet Take 20 mg by mouth as needed.  metFORMIN (GLUCOPHAGE) 500 mg tablet Take 500 mg by mouth two (2) times daily (with meals).  cpap machine kit by Does Not Apply route nightly.  simvastatin (ZOCOR) 20 mg tablet Take  by mouth nightly.  potassium chloride SR (KLOR-CON 10) 10 mEq tablet Take 20 mEq by mouth daily.  aspirin delayed-release (ST. ALEXANDER ASPIRIN) 81 mg tablet Take  by mouth daily.  lisinopril (PRINIVIL, ZESTRIL) 40 mg tablet Take 40 mg by mouth daily.  amlodipine (NORVASC) 10 mg tablet Take  by mouth daily.  hydrochlorothiazide (HYDRODIURIL) 25 mg tablet Take 25 mg by mouth daily.        No facility-administered encounter medications on file as of 8/26/2020. Review of Symptoms:    Review of Systems   Constitutional: Negative for chills, fever and weight loss. HENT: Negative for nosebleeds. Eyes: Negative for blurred vision and double vision. Respiratory: Negative for cough, shortness of breath and wheezing. Cardiovascular: Negative for chest pain, palpitations, orthopnea, leg swelling and PND. Gastrointestinal: Negative for abdominal pain, blood in stool, diarrhea, nausea and vomiting. Musculoskeletal: Negative for joint pain. Skin: Negative for rash. Neurological: Negative for dizziness, tingling and loss of consciousness. Endo/Heme/Allergies: Does not bruise/bleed easily. Physical Exam:      General: Well developed, in no acute distress, cooperative and alert  HEENT: No carotid bruits, no JVD, trach is midline. Neck Supple, PEERL, EOM intact. Heart:  reg rate and rhythm; normal S1/S2; no murmurs, no gallops or rubs. Respiratory: Clear bilaterally x 4, no wheezing or rales  Abdomen:   Soft, non-tender, no distention, no masses. + BS. Extremities:  Normal cap refill, no cyanosis, atraumatic. No edema. Neuro: A&Ox3, speech clear, gait stable. Skin: Skin color is normal. No rashes or lesions.  Non diaphoretic  Vascular: 2+ pulses symmetric in all extremities    Vitals:    08/26/20 1111 08/26/20 1118   BP: 128/70 128/68   Pulse: (!) 57    Resp: 18    SpO2: 97%    Weight: 277 lb 6.4 oz (125.8 kg)    Height: 5' 8\" (1.727 m)        ECG: SB    Cardiology Labs:    Lab Results   Component Value Date/Time    Cholesterol, total 154 12/28/2010 03:10 AM    HDL Cholesterol 33 12/28/2010 03:10 AM    LDL, calculated 110.8 (H) 12/28/2010 03:10 AM    VLDL, calculated 10.2 12/28/2010 03:10 AM    CHOL/HDL Ratio 4.7 12/28/2010 03:10 AM       Lab Results   Component Value Date/Time    Hemoglobin A1c 6.5 (H) 12/27/2010 02:45 PM       Lab Results   Component Value Date/Time    Sodium 142 06/29/2015 12:07 PM    Potassium 4.1 06/29/2015 12:07 PM    Chloride 101 06/29/2015 12:07 PM    CO2 26 06/29/2015 12:07 PM    Glucose 100 (H) 06/29/2015 12:07 PM    BUN 12 06/29/2015 12:07 PM    Creatinine 0.68 (L) 06/29/2015 12:07 PM    BUN/Creatinine ratio 18 06/29/2015 12:07 PM    GFR est  06/29/2015 12:07 PM    GFR est non-AA 99 06/29/2015 12:07 PM    Calcium 8.9 06/29/2015 12:07 PM    Anion gap 11 12/28/2010 03:10 AM    Bilirubin, total 0.5 12/27/2010 05:00 PM    ALT (SGPT) 25 12/27/2010 05:00 PM    Alk. phosphatase 57 12/27/2010 05:00 PM    Protein, total 7.4 12/27/2010 05:00 PM    Albumin 3.2 (L) 12/27/2010 05:00 PM    Globulin 4.2 (H) 12/27/2010 05:00 PM    A-G Ratio 0.8 (L) 12/27/2010 05:00 PM          Assessment:       ICD-10-CM ICD-9-CM    1. PAF (paroxysmal atrial fibrillation) (HCC)  I48.0 427.31    2. Essential hypertension, benign  I10 401.1    3. Mixed hyperlipidemia  E78.2 272.2 AMB POC EKG ROUTINE W/ 12 LEADS, INTER & REP   4. NATASHA (obstructive sleep apnea)  G47.33 327.23    5. Chronic systolic heart failure (HCC)  I50.22 428.22         Plan:     PAF  Maintaining sinus rhythm  Continue carvedilol for rate control  On Eliquis for stroke prevention, denies any bleeding or excessive bruising  Can hold eliquis for 24 hrs for teeth extraction    NICM, resolved  Echo done 9/2017 with preserved ejection fraction 55-60% with mild MR  Continue carvedilol, lisinopril    HTN  BP controlled. Continue anti-hypertensive therapy and low sodium diet    HLD  Continue statin therapy and low fat, low cholesterol diet  Lipids managed by PCP  Will request labs from PCP    NATASHA  On CPAP therapy      F/u with Dr. Neftali Lu in 6 mos      Nita Shepherd NP       [facility]    8/26/2020         Patient seen, examined by me personally. Plan discussed as detailed. Agree with note as outlined by  NP. I confirm findings in history and physical exam. No additional findings noted.  Agree with plan as outlined above. Doing well from cardiac stand point.      Antonio Dumont MD

## 2020-08-26 ENCOUNTER — OFFICE VISIT (OUTPATIENT)
Dept: CARDIOLOGY CLINIC | Age: 75
End: 2020-08-26
Payer: COMMERCIAL

## 2020-08-26 VITALS
SYSTOLIC BLOOD PRESSURE: 128 MMHG | RESPIRATION RATE: 18 BRPM | HEART RATE: 57 BPM | OXYGEN SATURATION: 97 % | HEIGHT: 68 IN | BODY MASS INDEX: 42.04 KG/M2 | WEIGHT: 277.4 LBS | DIASTOLIC BLOOD PRESSURE: 68 MMHG

## 2020-08-26 DIAGNOSIS — I48.0 PAF (PAROXYSMAL ATRIAL FIBRILLATION) (HCC): Primary | ICD-10-CM

## 2020-08-26 DIAGNOSIS — E78.2 MIXED HYPERLIPIDEMIA: ICD-10-CM

## 2020-08-26 DIAGNOSIS — G47.33 OSA (OBSTRUCTIVE SLEEP APNEA): ICD-10-CM

## 2020-08-26 DIAGNOSIS — I50.22 CHRONIC SYSTOLIC HEART FAILURE (HCC): ICD-10-CM

## 2020-08-26 DIAGNOSIS — I10 ESSENTIAL HYPERTENSION, BENIGN: ICD-10-CM

## 2020-08-26 PROCEDURE — G8752 SYS BP LESS 140: HCPCS | Performed by: INTERNAL MEDICINE

## 2020-08-26 PROCEDURE — 99213 OFFICE O/P EST LOW 20 MIN: CPT | Performed by: INTERNAL MEDICINE

## 2020-08-26 PROCEDURE — G8417 CALC BMI ABV UP PARAM F/U: HCPCS | Performed by: INTERNAL MEDICINE

## 2020-08-26 PROCEDURE — G0463 HOSPITAL OUTPT CLINIC VISIT: HCPCS | Performed by: INTERNAL MEDICINE

## 2020-08-26 PROCEDURE — G8536 NO DOC ELDER MAL SCRN: HCPCS | Performed by: INTERNAL MEDICINE

## 2020-08-26 PROCEDURE — G8427 DOCREV CUR MEDS BY ELIG CLIN: HCPCS | Performed by: INTERNAL MEDICINE

## 2020-08-26 PROCEDURE — G8432 DEP SCR NOT DOC, RNG: HCPCS | Performed by: INTERNAL MEDICINE

## 2020-08-26 PROCEDURE — 1101F PT FALLS ASSESS-DOCD LE1/YR: CPT | Performed by: INTERNAL MEDICINE

## 2020-08-26 PROCEDURE — 3017F COLORECTAL CA SCREEN DOC REV: CPT | Performed by: INTERNAL MEDICINE

## 2020-08-26 PROCEDURE — G8754 DIAS BP LESS 90: HCPCS | Performed by: INTERNAL MEDICINE

## 2020-08-26 PROCEDURE — 93010 ELECTROCARDIOGRAM REPORT: CPT | Performed by: INTERNAL MEDICINE

## 2020-08-26 PROCEDURE — 93005 ELECTROCARDIOGRAM TRACING: CPT | Performed by: INTERNAL MEDICINE

## 2020-08-26 NOTE — PROGRESS NOTES
1. Have you been to the ER, urgent care clinic since your last visit? Hospitalized since your last visit? No    2. Have you seen or consulted any other health care providers outside of the [address] [place] [address] since your last visit? Include any pap smears or colon screening. No    Chief Complaint   Patient presents with    Irregular Heart Beat     6 mo f/u    Hypertension     6 mo f/u    Cholesterol Problem     6 mo f/u     Pt denies cardiac sx.

## 2020-08-26 NOTE — LETTER
8/26/20 Patient: Darron Schwartz YOB: 1945 Date of Visit: 8/26/2020 Tosin Ricardo MD 
[id number] [address] [address] VIA Facsimile: [phone] Dear Tosin Ricardo MD, Thank you for referring Mr. Maria Luisa Hunter to [address] [place] [address] for evaluation. My notes for this consultation are attached. If you have questions, please do not hesitate to call me. I look forward to following your patient along with you. Sincerely, Kim Bush MD

## 2021-03-02 ENCOUNTER — OFFICE VISIT (OUTPATIENT)
Dept: CARDIOLOGY CLINIC | Age: 76
End: 2021-03-02
Payer: MEDICARE

## 2021-03-02 VITALS
RESPIRATION RATE: 18 BRPM | OXYGEN SATURATION: 98 % | SYSTOLIC BLOOD PRESSURE: 164 MMHG | WEIGHT: 277 LBS | HEIGHT: 68 IN | HEART RATE: 58 BPM | BODY MASS INDEX: 41.98 KG/M2 | DIASTOLIC BLOOD PRESSURE: 80 MMHG

## 2021-03-02 DIAGNOSIS — I42.0 DILATED CARDIOMYOPATHY (HCC): ICD-10-CM

## 2021-03-02 DIAGNOSIS — E78.2 MIXED HYPERLIPIDEMIA: ICD-10-CM

## 2021-03-02 DIAGNOSIS — I10 ESSENTIAL HYPERTENSION, BENIGN: ICD-10-CM

## 2021-03-02 DIAGNOSIS — I48.0 PAF (PAROXYSMAL ATRIAL FIBRILLATION) (HCC): Primary | ICD-10-CM

## 2021-03-02 PROCEDURE — G8754 DIAS BP LESS 90: HCPCS | Performed by: INTERNAL MEDICINE

## 2021-03-02 PROCEDURE — 3017F COLORECTAL CA SCREEN DOC REV: CPT | Performed by: INTERNAL MEDICINE

## 2021-03-02 PROCEDURE — 93005 ELECTROCARDIOGRAM TRACING: CPT | Performed by: INTERNAL MEDICINE

## 2021-03-02 PROCEDURE — G8510 SCR DEP NEG, NO PLAN REQD: HCPCS | Performed by: INTERNAL MEDICINE

## 2021-03-02 PROCEDURE — G8753 SYS BP > OR = 140: HCPCS | Performed by: INTERNAL MEDICINE

## 2021-03-02 PROCEDURE — 1101F PT FALLS ASSESS-DOCD LE1/YR: CPT | Performed by: INTERNAL MEDICINE

## 2021-03-02 PROCEDURE — 93010 ELECTROCARDIOGRAM REPORT: CPT | Performed by: INTERNAL MEDICINE

## 2021-03-02 PROCEDURE — G8417 CALC BMI ABV UP PARAM F/U: HCPCS | Performed by: INTERNAL MEDICINE

## 2021-03-02 PROCEDURE — G8427 DOCREV CUR MEDS BY ELIG CLIN: HCPCS | Performed by: INTERNAL MEDICINE

## 2021-03-02 PROCEDURE — 99213 OFFICE O/P EST LOW 20 MIN: CPT | Performed by: INTERNAL MEDICINE

## 2021-03-02 PROCEDURE — G8536 NO DOC ELDER MAL SCRN: HCPCS | Performed by: INTERNAL MEDICINE

## 2021-03-02 PROCEDURE — G0463 HOSPITAL OUTPT CLINIC VISIT: HCPCS | Performed by: INTERNAL MEDICINE

## 2021-03-02 RX ORDER — HYDRALAZINE HYDROCHLORIDE 25 MG/1
25 TABLET, FILM COATED ORAL 3 TIMES DAILY
Qty: 270 TAB | Refills: 3 | Status: SHIPPED | OUTPATIENT
Start: 2021-03-02 | End: 2022-02-18

## 2021-03-02 NOTE — LETTER
3/3/2021 Patient: Jean Claude Gordon YOB: 1945 Date of Visit: 3/2/2021 Bonnie Grant MD 
[facility] [place] [facility] [address] [address] Via Fax: [phone] Dear Bonnie Grant MD, Thank you for referring Mr. Milton Mohr to [address] [place] [address] for evaluation. My notes for this consultation are attached. If you have questions, please do not hesitate to call me. I look forward to following your patient along with you. Sincerely, Jer Suarez MD

## 2021-03-02 NOTE — PROGRESS NOTES
1. Have you been to the ER, urgent care clinic since your last visit? Hospitalized since your last visit? No    2. Have you seen or consulted any other health care providers outside of the [address] [place] [address] since your last visit? Include any pap smears or colon screening. No    Chief Complaint   Patient presents with    Cardiomyopathy     6 mo appt. Denied cardiac symptoms.

## 2021-03-02 NOTE — PROGRESS NOTES
Felisha Wright, [facility]    Subjective/HPI:     Tony Walker is a 70 y.o. male is here for routine f/u. He has a PMHx of non-ischemic cardiomyopathy now resolved, PAF, DM2, HTN, HLD and NATASHA on CPAP. His daughter was in the hospital for a month in the ICU with pneumonia. He had been visiting her every day and as a result ate a lot of fast food. She has been home now for 2 weeks, and has a PICC line. Has noted BP to be high, but last check was about a month ago. Has been compliant with all his medications. He otherwise denies CP, shortness of breath, edema, orthopnea, dizziness, PND or palpitations. Current Outpatient Medications on File Prior to Visit   Medication Sig Dispense Refill    carvediloL (COREG) 6.25 mg tablet TAKE 1 TABLET BY MOUTH TWICE DAILY 60 Tab 12    Eliquis 5 mg tablet TAKE 1 TABLET BY MOUTH TWICE DAILY. STOP COUMADIN 60 Tab 12    sildenafil, pulm. hypertension, (REVATIO) 20 mg tablet Take 20 mg by mouth as needed.  metFORMIN (GLUCOPHAGE) 500 mg tablet Take 500 mg by mouth two (2) times daily (with meals).  cpap machine kit by Does Not Apply route nightly.  simvastatin (ZOCOR) 20 mg tablet Take  by mouth nightly.  potassium chloride SR (KLOR-CON 10) 10 mEq tablet Take 20 mEq by mouth daily.  aspirin delayed-release (ST. ALEXANDER ASPIRIN) 81 mg tablet Take  by mouth daily.  lisinopril (PRINIVIL, ZESTRIL) 40 mg tablet Take 40 mg by mouth daily.  amlodipine (NORVASC) 10 mg tablet Take  by mouth daily.  hydrochlorothiazide (HYDRODIURIL) 25 mg tablet Take 25 mg by mouth daily. No current facility-administered medications on file prior to visit. Review of Symptoms:    Review of Systems   Constitutional: Negative for chills, fever and weight loss. HENT: Negative for nosebleeds. Eyes: Negative for blurred vision and double vision. Respiratory: Negative for cough, shortness of breath and wheezing. Cardiovascular: Negative for chest pain, palpitations, orthopnea, leg swelling and PND. Gastrointestinal: Negative for abdominal pain, blood in stool, diarrhea, nausea and vomiting. Musculoskeletal: Negative for joint pain. Skin: Negative for rash. Neurological: Negative for dizziness, tingling and loss of consciousness. Endo/Heme/Allergies: Does not bruise/bleed easily. Physical Exam:      General: Well developed, in no acute distress, cooperative and alert  Heart:  reg rate and rhythm; normal S1/S2; no murmurs, no gallops or rubs. Respiratory: Clear bilaterally x 4, no wheezing or rales  Extremities:  Normal cap refill, no cyanosis, atraumatic. No edema. Vascular: 2+ pulses symmetric in all extremities    Vitals:    03/02/21 1147 03/02/21 1156   BP: (!) 170/80 (!) 164/80   Pulse: (!) 58    Resp: 18    SpO2: 98%    Weight: 277 lb (125.6 kg)    Height: 5' 8\" (1.727 m)        ECG: sinus bradycardia; PACs     Assessment:       ICD-10-CM ICD-9-CM    1. PAF (paroxysmal atrial fibrillation) ([facility])  I48.0 427.31    2. Dilated cardiomyopathy ([facility])  I42.0 425.4 AMB POC EKG ROUTINE W/ 12 LEADS, INTER & REP   3. Essential hypertension, benign  I10 401.1    4. Mixed hyperlipidemia  E78.2 272.2         Plan:     1. PAF  Maintaining sinus rhythm  Continue carvedilol for rate control  On Eliquis for stroke prevention     2. Dilated cardiomyopathy  Echo done 9/2017 with preserved ejection fraction 55-60% with mild MR  Euvolemic on exam  Continue carvedilol, lisinopril     3.  Essential hypertension  BP not controlled  Start hydralazine 25 mg TID  Check BP daily for 1 month and bring records to the office     4. Mixed hyperlipidemia  Continue statin therapy and low fat, low cholesterol diet  Lipids managed by PCP     F/u with me in 1 month. Discussed low salt diet, weight reduction.     1330 Orin Amos MD

## 2021-04-23 ENCOUNTER — OFFICE VISIT (OUTPATIENT)
Dept: CARDIOLOGY CLINIC | Age: 76
End: 2021-04-23
Payer: MEDICARE

## 2021-04-23 VITALS
HEART RATE: 58 BPM | OXYGEN SATURATION: 99 % | WEIGHT: 276.5 LBS | SYSTOLIC BLOOD PRESSURE: 136 MMHG | RESPIRATION RATE: 18 BRPM | BODY MASS INDEX: 41.91 KG/M2 | HEIGHT: 68 IN | DIASTOLIC BLOOD PRESSURE: 62 MMHG

## 2021-04-23 DIAGNOSIS — E78.2 MIXED HYPERLIPIDEMIA: ICD-10-CM

## 2021-04-23 DIAGNOSIS — I42.0 DILATED CARDIOMYOPATHY (HCC): ICD-10-CM

## 2021-04-23 DIAGNOSIS — I48.0 PAF (PAROXYSMAL ATRIAL FIBRILLATION) (HCC): ICD-10-CM

## 2021-04-23 DIAGNOSIS — I10 ESSENTIAL HYPERTENSION, BENIGN: Primary | ICD-10-CM

## 2021-04-23 PROCEDURE — G8510 SCR DEP NEG, NO PLAN REQD: HCPCS | Performed by: INTERNAL MEDICINE

## 2021-04-23 PROCEDURE — G8754 DIAS BP LESS 90: HCPCS | Performed by: INTERNAL MEDICINE

## 2021-04-23 PROCEDURE — G8536 NO DOC ELDER MAL SCRN: HCPCS | Performed by: INTERNAL MEDICINE

## 2021-04-23 PROCEDURE — G8417 CALC BMI ABV UP PARAM F/U: HCPCS | Performed by: INTERNAL MEDICINE

## 2021-04-23 PROCEDURE — G8752 SYS BP LESS 140: HCPCS | Performed by: INTERNAL MEDICINE

## 2021-04-23 PROCEDURE — G8427 DOCREV CUR MEDS BY ELIG CLIN: HCPCS | Performed by: INTERNAL MEDICINE

## 2021-04-23 PROCEDURE — 3017F COLORECTAL CA SCREEN DOC REV: CPT | Performed by: INTERNAL MEDICINE

## 2021-04-23 PROCEDURE — 99213 OFFICE O/P EST LOW 20 MIN: CPT | Performed by: INTERNAL MEDICINE

## 2021-04-23 PROCEDURE — 1101F PT FALLS ASSESS-DOCD LE1/YR: CPT | Performed by: INTERNAL MEDICINE

## 2021-04-23 NOTE — LETTER
4/27/2021 Patient: Neftali Wilson YOB: 1945 Date of Visit: 4/23/2021 Naheed Johnson MD 
[address] [address] Via Fax: [phone] Dear Naheed Johnson MD, Thank you for referring Mr. Sandra Law to [address] [place] [address] [place] for evaluation. My notes for this consultation are attached. If you have questions, please do not hesitate to call me. I look forward to following your patient along with you. Sincerely, Timi Baldwin MD

## 2021-04-23 NOTE — PROGRESS NOTES
Chief Complaint   Patient presents with    Hypertension     One Month Follow Up - Started Hydralazine 25 mg three times a day at last office visit. Denies Cardiac Symptoms     Vitals:    04/23/21 1103   BP: 136/62   BP 1 Location: Right arm   BP Patient Position: Sitting   BP Cuff Size: Large adult   Pulse: (!) 58   Resp: 18   SpO2: 99%   Weight: 276 lb 8 oz (125.4 kg)   Height: 5' 8\" (1.727 m)   . 1. Have you been to the ER, urgent care clinic since your last visit? Hospitalized since your last visit? No    2. Have you seen or consulted any other health care providers outside of the [address] [place] [address] since your last visit? Include any pap smears or colon screening.  No

## 2021-04-23 NOTE — PROGRESS NOTES
Macario Hernandez, [facility]    Subjective/HPI:     Huma Bolanos is a 70 y.o. male is here for routine f/u. He has a PMHx of non-ischemic cardiomyopathy now resolved, PAF, DM2, HTN, HLD and NATASHA on CPAP. At last visit, hydralazine was added for BP control. BP at home is doing well. Daughter is back home after being in the ICU with COVID for 1 month. Is a lot less stressed now. Current Outpatient Medications on File Prior to Visit   Medication Sig Dispense Refill    hydrALAZINE (APRESOLINE) 25 mg tablet Take 1 Tab by mouth three (3) times daily. 270 Tab 3    carvediloL (COREG) 6.25 mg tablet TAKE 1 TABLET BY MOUTH TWICE DAILY 60 Tab 12    Eliquis 5 mg tablet TAKE 1 TABLET BY MOUTH TWICE DAILY. STOP COUMADIN 60 Tab 12    sildenafil, pulm. hypertension, (REVATIO) 20 mg tablet Take 20 mg by mouth as needed.  metFORMIN (GLUCOPHAGE) 500 mg tablet Take 500 mg by mouth two (2) times daily (with meals).  cpap machine kit by Does Not Apply route nightly.  simvastatin (ZOCOR) 20 mg tablet Take  by mouth nightly.  potassium chloride SR (KLOR-CON 10) 10 mEq tablet Take 20 mEq by mouth daily.  aspirin delayed-release (ST. ALEXANDER ASPIRIN) 81 mg tablet Take  by mouth daily.  lisinopril (PRINIVIL, ZESTRIL) 40 mg tablet Take 40 mg by mouth daily.  amlodipine (NORVASC) 10 mg tablet Take  by mouth daily.  hydrochlorothiazide (HYDRODIURIL) 25 mg tablet Take 25 mg by mouth daily. No current facility-administered medications on file prior to visit. Review of Symptoms:    Review of Systems   Constitutional: Negative for chills, fever and weight loss. HENT: Negative for nosebleeds. Eyes: Negative for blurred vision and double vision. Respiratory: Negative for cough, shortness of breath and wheezing. Cardiovascular: Negative for chest pain, palpitations, orthopnea, leg swelling and PND.    Gastrointestinal: Negative for abdominal pain, blood in stool, diarrhea, nausea and vomiting. Musculoskeletal: Negative for joint pain. Skin: Negative for rash. Neurological: Negative for dizziness, tingling and loss of consciousness. Endo/Heme/Allergies: Does not bruise/bleed easily. Physical Exam:      General: Well developed, in no acute distress, cooperative and alert  Heart:  reg rate and rhythm; normal S1/S2; no murmurs, no gallops or rubs. Respiratory: Clear bilaterally x 4, no wheezing or rales  Extremities:  Normal cap refill, no cyanosis, atraumatic. No edema. Vascular: 2+ pulses symmetric in all extremities    Vitals:    04/23/21 1103   BP: 136/62   Pulse: (!) 58   Resp: 18   SpO2: 99%   Weight: 276 lb 8 oz (125.4 kg)   Height: 5' 8\" (1.727 m)        Assessment:       ICD-10-CM ICD-9-CM    1. Essential hypertension, benign  I10 401.1    2. PAF (paroxysmal atrial fibrillation) ([facility])  I48.0 427.31    3. Dilated cardiomyopathy ([facility])  I42.0 425.4    4. Mixed hyperlipidemia  E78.2 272.2         Plan:     1. Essential hypertension  BP improved with hydralazine  Continue present anti-hypertensives  Call for BP > 140/80 consistently    2. PAF  Maintaining sinus rhythm  Continue carvedilol for rate control  On Eliquis for stroke prevention     3. Dilated cardiomyopathy  Echo done 9/2017 with preserved ejection fraction 55-60% with mild MR  Euvolemic on exam  Continue carvedilol, lisinopril     4. Mixed hyperlipidemia  Continue statin therapy and low fat, low cholesterol diet  Lipids managed by PCP     F/u with Dr. Nayeli Gan in 6 months    Keena Cruz NP       [facility]    4/27/2021         Patient seen, examined by me personally. Plan discussed as detailed. Agree with note as outlined by  NP with modifications as noted. My independent physical exam reveals : Physical Exam   Constitutional: He is oriented to person, place, and time. He appears well-developed. HENT:   Head: Normocephalic.    Eyes: Conjunctivae are normal. Neck: Neck supple. Cardiovascular: Normal rate, regular rhythm and normal heart sounds. No murmur heard. Pulmonary/Chest: Effort normal and breath sounds normal. No respiratory distress. Musculoskeletal:         General: No edema. Neurological: He is alert and oriented to person, place, and time. Skin: Skin is warm and dry. Psychiatric: He has a normal mood and affect. Nursing note and vitals reviewed. No additional findings noted. Agree with plan as outlined above with modifications as noted.      Km Molina MD

## 2021-10-22 ENCOUNTER — HOSPITAL ENCOUNTER (EMERGENCY)
Age: 76
Discharge: HOME | End: 2021-10-22
Attending: EMERGENCY MEDICINE
Payer: MEDICARE

## 2021-10-22 ENCOUNTER — APPOINTMENT (OUTPATIENT)
Dept: GENERAL RADIOLOGY | Age: 76
End: 2021-10-22
Attending: PHYSICIAN ASSISTANT
Payer: MEDICARE

## 2021-10-22 VITALS
TEMPERATURE: 98.2 F | HEIGHT: 68 IN | BODY MASS INDEX: 41.4 KG/M2 | SYSTOLIC BLOOD PRESSURE: 174 MMHG | WEIGHT: 273.15 LBS | HEART RATE: 58 BPM | OXYGEN SATURATION: 100 % | DIASTOLIC BLOOD PRESSURE: 69 MMHG | RESPIRATION RATE: 18 BRPM

## 2021-10-22 DIAGNOSIS — M54.41 ACUTE RIGHT-SIDED LOW BACK PAIN WITH RIGHT-SIDED SCIATICA: Primary | ICD-10-CM

## 2021-10-22 PROCEDURE — 74011250637 HC RX REV CODE- 250/637: Performed by: PHYSICIAN ASSISTANT

## 2021-10-22 PROCEDURE — 99282 EMERGENCY DEPT VISIT SF MDM: CPT

## 2021-10-22 PROCEDURE — 72100 X-RAY EXAM L-S SPINE 2/3 VWS: CPT

## 2021-10-22 RX ORDER — DIAZEPAM 5 MG/1
2.5 TABLET ORAL
Status: COMPLETED | OUTPATIENT
Start: 2021-10-22 | End: 2021-10-22

## 2021-10-22 RX ORDER — CYCLOBENZAPRINE HCL 10 MG
10 TABLET ORAL 2 TIMES DAILY
Qty: 10 TABLET | Refills: 0 | Status: SHIPPED | OUTPATIENT
Start: 2021-10-22 | End: 2021-10-27

## 2021-10-22 RX ORDER — GABAPENTIN 100 MG/1
CAPSULE ORAL
Qty: 15 CAPSULE | Refills: 0 | Status: SHIPPED | OUTPATIENT
Start: 2021-10-22

## 2021-10-22 RX ORDER — LIDOCAINE 50 MG/G
PATCH TOPICAL
Qty: 5 EACH | Refills: 0 | Status: ON HOLD | OUTPATIENT
Start: 2021-10-22 | End: 2022-06-22

## 2021-10-22 RX ADMIN — DIAZEPAM 2.5 MG: 5 TABLET ORAL at 16:30

## 2021-10-22 NOTE — ED PROVIDER NOTES
EMERGENCY DEPARTMENT HISTORY AND PHYSICAL EXAM      Date: 10/22/2021  Patient Name: Jean Claude Gordon    History of Presenting Illness     Chief Complaint   Patient presents with    Back Pain     pt states pain in right leg up into back 8/10 x 1 week  to the point he is unable to sleep in his bed.  Leg Pain       History Provided By: Patient and patient's wife    HPI: Jean Claude Gordon, 78 y.o. male presents ambulatory to the Emergency Dept with c/o 1 week h/o R sided low back pain which radiates into his R LE. He denied any known injury, but later reported he has been going up and down a ladder multiple times recently. He denied h/o chronic back pain. No numbness/tinglng/weakness. No dysuria/hematuria. No constipation/straining. He denied rash/lesion. No incontinence of bowel/bladder. No personal or family h/o kidney stones. He rates his pain a 8/10 on the pain scale and describes it as an ache. Pt is o/w healthy without fever, chills, cough, congestion, ST, shortness of breath, chest pain, N/V/D. There are no other complaints, changes, or physical findings at this time. PCP: Salvador Reagan MD    Current Outpatient Medications   Medication Sig Dispense Refill    cyclobenzaprine (FLEXERIL) 10 mg tablet Take 1 Tablet by mouth two (2) times a day for 5 days. 10 Tablet 0    lidocaine (LIDODERM) 5 % Apply patch to the affected area for 12 hours a day and remove for 12 hours a day. 5 Each 0    gabapentin (Neurontin) 100 mg capsule May take 1 capsule up to three times daily for pain 15 Capsule 0    carvediloL (COREG) 6.25 mg tablet TAKE 1 TABLET BY MOUTH TWICE DAILY 180 Tab 3    apixaban (Eliquis) 5 mg tablet TAKE 1 TABLET BY MOUTH TWICE DAILY 180 Tab 3    hydrALAZINE (APRESOLINE) 25 mg tablet Take 1 Tab by mouth three (3) times daily. 270 Tab 3    sildenafil, pulm. hypertension, (REVATIO) 20 mg tablet Take 20 mg by mouth as needed.       metFORMIN (GLUCOPHAGE) 500 mg tablet Take 500 mg by mouth two (2) times daily (with meals).  cpap machine kit by Does Not Apply route nightly.  simvastatin (ZOCOR) 20 mg tablet Take  by mouth nightly.  potassium chloride SR (KLOR-CON 10) 10 mEq tablet Take 20 mEq by mouth daily.  aspirin delayed-release (ST. ALEXANDER ASPIRIN) 81 mg tablet Take  by mouth daily.  lisinopril (PRINIVIL, ZESTRIL) 40 mg tablet Take 40 mg by mouth daily.  amlodipine (NORVASC) 10 mg tablet Take  by mouth daily.  hydrochlorothiazide (HYDRODIURIL) 25 mg tablet Take 25 mg by mouth daily.          Past History     Past Medical History:  Past Medical History:   Diagnosis Date    Adverse effect of anesthesia     slow to wake    Arrhythmia     a. fib    Arthritis     hands    Cardiomegaly     Cardiomyopathy in other diseases classified elsewhere     Diabetes ([facility] [address]     BORDERLINE    Difficult intubation     Hyperlipidemia     Hypertension     Other acute and subacute form of ischemic heart disease     Other ill-defined conditions(799.89)     ATHEROSCLEROSIS/HYPERLIPIDEMIA    PAF (paroxysmal atrial fibrillation) (HCC)     Shortness of breath     Unspecified sleep apnea     USES C-PAP/pt dosent know dr name       Past Surgical History:  Past Surgical History:   Procedure Laterality Date    COLONOSCOPY N/A 1/13/2017    COLONOSCOPY performed by Eula Yoo MD at [facility] ENDOSCOPY    COLONOSCOPY N/A 12/21/2018    COLONOSCOPY performed by Soham Alvarez MD at [facility] ENDOSCOPY    COLONOSCOPY,SALVADOR REZA,SNARE  1/13/2017         HX GI      COLONOSCOPY    HX HERNIA REPAIR Left     LEFT INGUINAL HERNIA    HX KNEE REPLACEMENT Bilateral 2006    BILAT TKR    MN COLSC FLX W/REMOVAL LESION BY HOT BX FORCEPS  5/14/2014            Family History:  Family History   Problem Relation Age of Onset    Heart Disease Mother         w/pacemaker    Diabetes Father     Diabetes Sister        Social History:  Social History     Tobacco Use    Smoking status: Never Smoker  Smokeless tobacco: Never Used   Vaping Use    Vaping Use: Never used   Substance Use Topics    Alcohol use: No     Alcohol/week: 0.0 standard drinks    Drug use: No       Allergies:  No Known Allergies      Review of Systems   Review of Systems   Constitutional: Negative for chills and fever. HENT: Negative for congestion, rhinorrhea and sore throat. Respiratory: Negative for cough and shortness of breath. Cardiovascular: Negative for chest pain and palpitations. Gastrointestinal: Negative for abdominal pain, diarrhea, nausea and vomiting. Endocrine: Negative for polydipsia, polyphagia and polyuria. Genitourinary: Negative for decreased urine volume, difficulty urinating, dysuria, flank pain and hematuria. Musculoskeletal: Positive for back pain. Negative for neck pain and neck stiffness. Skin: Negative for color change, pallor, rash and wound. Allergic/Immunologic: Negative for food allergies and immunocompromised state. Neurological: Negative for weakness, numbness and headaches. Hematological: Negative for adenopathy. Does not bruise/bleed easily. Psychiatric/Behavioral: Negative for agitation and confusion. All other systems reviewed and are negative. Physical Exam   Physical Exam  Vitals and nursing note reviewed. Constitutional:       General: He is not in acute distress. Appearance: Normal appearance. He is well-developed and normal weight. He is not ill-appearing, toxic-appearing or diaphoretic. HENT:      Head: Normocephalic and atraumatic. Nose: Nose normal. No congestion or rhinorrhea. Mouth/Throat:      Mouth: Mucous membranes are moist.   Eyes:      General: No scleral icterus. Right eye: No discharge. Left eye: No discharge. Conjunctiva/sclera: Conjunctivae normal.   Neck:      Thyroid: No thyromegaly. Vascular: No JVD. Trachea: No tracheal deviation.    Cardiovascular:      Rate and Rhythm: Normal rate and regular rhythm. Pulses: Normal pulses. Heart sounds: Normal heart sounds. Pulmonary:      Effort: Pulmonary effort is normal. No respiratory distress. Breath sounds: Normal breath sounds. No wheezing. Abdominal:      Palpations: Abdomen is soft. Tenderness: There is no abdominal tenderness. There is no right CVA tenderness, left CVA tenderness, guarding or rebound. Musculoskeletal:         General: Tenderness present. Cervical back: Normal range of motion and neck supple. No tenderness. Comments: Decreased A/P ROM to R paralumbar region due to tenderness with palpation and movement. +SLR R. No deformity noted. No midline spinal tenderness. Pt observed to ambulate without deficit. 2+ distal pulses, NVI. Sensation grossly intact to light touch. Skin:     General: Skin is warm and dry. Coloration: Skin is not pale. Findings: No bruising, erythema or rash. Neurological:      General: No focal deficit present. Mental Status: He is alert and oriented to person, place, and time. Sensory: No sensory deficit. Motor: No weakness or abnormal muscle tone. Coordination: Coordination normal.   Psychiatric:         Mood and Affect: Mood normal.         Behavior: Behavior normal.         Judgment: Judgment normal.         Diagnostic Study Results     Labs -   No results found for this or any previous visit (from the past 12 hour(s)). Radiologic Studies -   XR SPINE LUMB 2 OR 3 V   Final Result   No acute fracture or subluxation. Medical Decision Making   I am the first provider for this patient. I reviewed the vital signs, available nursing notes, past medical history, past surgical history, family history and social history. Vital Signs-Reviewed the patient's vital signs.   Patient Vitals for the past 12 hrs:   Temp Pulse Resp BP SpO2   10/22/21 1520 98.2 °F (36.8 °C) (!) 58 18 (!) 174/69 100 %           Records Reviewed: Nursing Notes, Old Medical Records, Previous Radiology Studies and Previous Laboratory Studies    Provider Notes (Medical Decision Making):   DDD, OA, spasm, sciatica, strain    ED Course:   Initial assessment performed. The patients presenting problems have been discussed, and they are in agreement with the care plan formulated and outlined with them. I have encouraged them to ask questions as they arise throughout their visit. DISCHARGE NOTE:  The care plan has been outline with the patient and/or family, who verbally conveyed understanding and agreement. Available results have been reviewed. Patient and/or family understand the follow up plan as outlined and discharge instructions. Should their condition deterioration at any time after discharge the patient agrees to return, follow up sooner than outlined or seek medical assistance at the closest Emergency Room as soon as possible. Questions have been answered. Discharge instructions and educational information regarding the patient's diagnosis as well a list of reasons why the patient would want to seek immediate medical attention, should their condition change, were reviewed directly with the patient/family          PLAN:  1. Current Discharge Medication List      START taking these medications    Details   cyclobenzaprine (FLEXERIL) 10 mg tablet Take 1 Tablet by mouth two (2) times a day for 5 days. Qty: 10 Tablet, Refills: 0  Start date: 10/22/2021, End date: 10/27/2021      lidocaine (LIDODERM) 5 % Apply patch to the affected area for 12 hours a day and remove for 12 hours a day. Qty: 5 Each, Refills: 0  Start date: 10/22/2021      gabapentin (Neurontin) 100 mg capsule May take 1 capsule up to three times daily for pain  Qty: 15 Capsule, Refills: 0  Start date: 10/22/2021    Associated Diagnoses: Acute right-sided low back pain with right-sided sciatica           2.    Follow-up Information     Follow up With Specialties Details Why Jasmin Michel MD Orthopedic Surgery   [address]  [address] [facility],[address]  [address] [address] [phone]      [facility] EMERGENCY DEPT Emergency Medicine  If symptoms worsen [address]  [address] [facility]  [phone]        Return to ED if worse     Diagnosis     Clinical Impression:   1.  Acute right-sided low back pain with right-sided sciatica

## 2021-11-30 ENCOUNTER — OFFICE VISIT (OUTPATIENT)
Dept: CARDIOLOGY CLINIC | Age: 76
End: 2021-11-30
Payer: MEDICARE

## 2021-11-30 VITALS
OXYGEN SATURATION: 98 % | SYSTOLIC BLOOD PRESSURE: 130 MMHG | DIASTOLIC BLOOD PRESSURE: 80 MMHG | HEART RATE: 56 BPM | RESPIRATION RATE: 18 BRPM | HEIGHT: 68 IN | BODY MASS INDEX: 42.15 KG/M2 | WEIGHT: 278.1 LBS

## 2021-11-30 DIAGNOSIS — I42.0 DILATED CARDIOMYOPATHY (HCC): ICD-10-CM

## 2021-11-30 DIAGNOSIS — E78.2 MIXED HYPERLIPIDEMIA: ICD-10-CM

## 2021-11-30 DIAGNOSIS — I48.0 PAF (PAROXYSMAL ATRIAL FIBRILLATION) (HCC): Primary | ICD-10-CM

## 2021-11-30 DIAGNOSIS — I10 ESSENTIAL HYPERTENSION, BENIGN: ICD-10-CM

## 2021-11-30 PROCEDURE — G8417 CALC BMI ABV UP PARAM F/U: HCPCS | Performed by: INTERNAL MEDICINE

## 2021-11-30 PROCEDURE — 93000 ELECTROCARDIOGRAM COMPLETE: CPT | Performed by: INTERNAL MEDICINE

## 2021-11-30 PROCEDURE — 99213 OFFICE O/P EST LOW 20 MIN: CPT | Performed by: INTERNAL MEDICINE

## 2021-11-30 PROCEDURE — 3017F COLORECTAL CA SCREEN DOC REV: CPT | Performed by: INTERNAL MEDICINE

## 2021-11-30 PROCEDURE — G8536 NO DOC ELDER MAL SCRN: HCPCS | Performed by: INTERNAL MEDICINE

## 2021-11-30 PROCEDURE — G8427 DOCREV CUR MEDS BY ELIG CLIN: HCPCS | Performed by: INTERNAL MEDICINE

## 2021-11-30 PROCEDURE — G8752 SYS BP LESS 140: HCPCS | Performed by: INTERNAL MEDICINE

## 2021-11-30 PROCEDURE — G8510 SCR DEP NEG, NO PLAN REQD: HCPCS | Performed by: INTERNAL MEDICINE

## 2021-11-30 PROCEDURE — 1101F PT FALLS ASSESS-DOCD LE1/YR: CPT | Performed by: INTERNAL MEDICINE

## 2021-11-30 PROCEDURE — G8754 DIAS BP LESS 90: HCPCS | Performed by: INTERNAL MEDICINE

## 2021-11-30 NOTE — PROGRESS NOTES
1. Have you been to the ER, urgent care clinic since your last visit? Hospitalized since your last visit? 10/22/21 ED, back & leg pain. 2. Have you seen or consulted any other health care providers outside of the [address] [place] [address] since your last visit? Include any pap smears or colon screening. No     Chief Complaint   Patient presents with    Hypertension     6 mo f/u.  No cardiac concerns

## 2021-11-30 NOTE — LETTER
11/30/2021    Patient: Annabel Pimentel   YOB: 1945   Date of Visit: 11/30/2021     Siomara Nurse, [address] [address]  Via Fax: [phone]    Dear Siomara Evans MD,      Thank you for referring Mr. Fidelina Coto to [address] [place] [address] for evaluation. My notes for this consultation are attached. If you have questions, please do not hesitate to call me. I look forward to following your patient along with you.       Sincerely,    Mandy Callahan MD

## 2021-11-30 NOTE — PROGRESS NOTES
Subjective/HPI:     Doug Boyle is a 78 y.o. male is here for routine f/u. He has a PMHx of non-ischemic cardiomyopathy now resolved, PAF, DM2, HTN, HLD and NATASHA on CPAP. Denies any chest pain, SOB, edema, palpitations. Current Outpatient Medications on File Prior to Visit   Medication Sig Dispense Refill    gabapentin (Neurontin) 100 mg capsule May take 1 capsule up to three times daily for pain 15 Capsule 0    carvediloL (COREG) 6.25 mg tablet TAKE 1 TABLET BY MOUTH TWICE DAILY 180 Tab 3    apixaban (Eliquis) 5 mg tablet TAKE 1 TABLET BY MOUTH TWICE DAILY 180 Tab 3    hydrALAZINE (APRESOLINE) 25 mg tablet Take 1 Tab by mouth three (3) times daily. 270 Tab 3    sildenafil, pulm. hypertension, (REVATIO) 20 mg tablet Take 20 mg by mouth as needed.  metFORMIN (GLUCOPHAGE) 500 mg tablet Take 500 mg by mouth two (2) times daily (with meals).  cpap machine kit by Does Not Apply route nightly.  simvastatin (ZOCOR) 20 mg tablet Take  by mouth nightly.  potassium chloride SR (KLOR-CON 10) 10 mEq tablet Take 20 mEq by mouth daily.  aspirin delayed-release (ST. ALEXANDER ASPIRIN) 81 mg tablet Take  by mouth daily.  lisinopril (PRINIVIL, ZESTRIL) 40 mg tablet Take 40 mg by mouth daily.  amlodipine (NORVASC) 10 mg tablet Take  by mouth daily.  hydrochlorothiazide (HYDRODIURIL) 25 mg tablet Take 25 mg by mouth daily.  lidocaine (LIDODERM) 5 % Apply patch to the affected area for 12 hours a day and remove for 12 hours a day. (Patient not taking: Reported on 11/30/2021) 5 Each 0     No current facility-administered medications on file prior to visit. Review of Symptoms:    Review of Systems   Constitutional: Negative for chills, fever and weight loss. HENT: Negative for nosebleeds. Eyes: Negative for blurred vision and double vision. Respiratory: Negative for cough, shortness of breath and wheezing.     Cardiovascular: Negative for chest pain, palpitations, orthopnea, leg swelling and PND. Skin: Negative for rash. Neurological: Negative for dizziness and loss of consciousness. Physical Exam:      Physical Exam  Vitals and nursing note reviewed. Constitutional:       Appearance: He is obese. Cardiovascular:      Rate and Rhythm: Normal rate and regular rhythm. Heart sounds: Normal heart sounds. Pulmonary:      Breath sounds: Normal breath sounds. Musculoskeletal:      Right lower leg: No edema. Left lower leg: No edema. Skin:     General: Skin is warm. Neurological:      Mental Status: He is oriented to person, place, and time. Psychiatric:         Mood and Affect: Mood normal.         Vitals:    11/30/21 0946   BP: 130/80   BP 1 Location: Right upper arm   BP Patient Position: Sitting   BP Cuff Size: Large adult   Pulse: (!) 56   Resp: 18   Height: 5' 8\" (1.727 m)   Weight: 278 lb 1.6 oz (126.1 kg)   SpO2: 98%       ECG done today shows sinus rythm     Assessment:       ICD-10-CM ICD-9-CM    1. PAF (paroxysmal atrial fibrillation) ([facility])  I48.0 427.31 ECHO ADULT COMPLETE   2. Dilated cardiomyopathy ([facility])  I42.0 425.4 ECHO ADULT COMPLETE   3. Essential hypertension, benign  I10 401.1 AMB POC EKG ROUTINE W/ 12 LEADS, INTER & REP      AMB POC EKG ROUTINE W/ 12 LEADS, INTER & REP   4. Mixed hyperlipidemia  E78.2 272.2         Plan:     1. PAF  Maintaining sinus rhythm  Continue carvedilol for rate control  On Eliquis for stroke prevention     2. Dilated cardiomyopathy  Echo done 9/2017 with preserved ejection fraction 55-60% with mild MR  Euvolemic on exam  Continue carvedilol, lisinopril  Repeat echo to assess MR    3. Essential hypertension  BP controlled. Continue anti-hypertensive therapy and low sodium diet     4.   Mixed hyperlipidemia  Continue statin therapy and low fat, low cholesterol diet  Lipids managed by PCP    Lars Pierre MD

## 2021-12-15 ENCOUNTER — ANCILLARY PROCEDURE (OUTPATIENT)
Dept: CARDIOLOGY CLINIC | Age: 76
End: 2021-12-15
Payer: MEDICARE

## 2021-12-15 VITALS
SYSTOLIC BLOOD PRESSURE: 130 MMHG | DIASTOLIC BLOOD PRESSURE: 80 MMHG | HEIGHT: 68 IN | BODY MASS INDEX: 42.13 KG/M2 | WEIGHT: 278 LBS

## 2021-12-15 DIAGNOSIS — I48.0 PAF (PAROXYSMAL ATRIAL FIBRILLATION) (HCC): ICD-10-CM

## 2021-12-15 DIAGNOSIS — I42.0 DILATED CARDIOMYOPATHY (HCC): ICD-10-CM

## 2021-12-15 PROCEDURE — 93306 TTE W/DOPPLER COMPLETE: CPT | Performed by: INTERNAL MEDICINE

## 2021-12-16 ENCOUNTER — TELEPHONE (OUTPATIENT)
Dept: CARDIOLOGY CLINIC | Age: 76
End: 2021-12-16

## 2021-12-16 LAB
ECHO AO ASC DIAM: 3.8 CM
ECHO AO ASCENDING AORTA INDEX: 1.62 CM/M2
ECHO AO ROOT DIAM: 3.3 CM
ECHO AO ROOT INDEX: 1.4 CM/M2
ECHO AV PEAK GRADIENT: 13 MMHG
ECHO AV PEAK VELOCITY: 1.8 M/S
ECHO AV VELOCITY RATIO: 0.72
ECHO EST RA PRESSURE: 3 MMHG
ECHO LA DIAMETER INDEX: 1.79 CM/M2
ECHO LA DIAMETER: 4.2 CM
ECHO LA TO AORTIC ROOT RATIO: 1.27
ECHO LA VOL 2C: 79 ML (ref 18–58)
ECHO LA VOL 4C: 55 ML (ref 18–58)
ECHO LA VOL BP: 72 ML (ref 18–58)
ECHO LA VOL/BSA BIPLANE: 31 ML/M2 (ref 16–28)
ECHO LA VOLUME AREA LENGTH: 77 ML
ECHO LA VOLUME INDEX A2C: 34 ML/M2 (ref 16–28)
ECHO LA VOLUME INDEX A4C: 23 ML/M2 (ref 16–28)
ECHO LA VOLUME INDEX AREA LENGTH: 33 ML/M2
ECHO LV E' SEPTAL VELOCITY: 8 CM/S
ECHO LV FRACTIONAL SHORTENING: 52 % (ref 28–44)
ECHO LV INTERNAL DIMENSION DIASTOLE INDEX: 2.6 CM/M2
ECHO LV INTERNAL DIMENSION DIASTOLIC: 6.1 CM (ref 4.2–5.9)
ECHO LV INTERNAL DIMENSION SYSTOLIC INDEX: 1.23 CM/M2
ECHO LV INTERNAL DIMENSION SYSTOLIC: 2.9 CM
ECHO LV ISOVOLUMETRIC RELAXATION TIME (IVRT): 74.2 MS
ECHO LV IVSD: 1.7 CM (ref 0.6–1)
ECHO LV MASS 2D: 481 G (ref 88–224)
ECHO LV MASS INDEX 2D: 204.7 G/M2 (ref 49–115)
ECHO LV POSTERIOR WALL DIASTOLIC: 1.5 CM (ref 0.6–1)
ECHO LV RELATIVE WALL THICKNESS RATIO: 0.49
ECHO LVOT PEAK GRADIENT: 7 MMHG
ECHO LVOT PEAK VELOCITY: 1.3 M/S
ECHO MV "A" WAVE DURATION: 167.5 MSEC
ECHO MV "A" WAVE DURATION: 167.5 MSEC
ECHO MV A VELOCITY: 1.05 M/S
ECHO MV E DECELERATION TIME (DT): 274.5 MS
ECHO MV E VELOCITY: 0.82 M/S
ECHO MV E/A RATIO: 0.78
ECHO MV E/E' SEPTAL: 10.25
ECHO RIGHT VENTRICULAR SYSTOLIC PRESSURE (RVSP): 32 MMHG
ECHO RV FREE WALL PEAK S': 21 CM/S
ECHO RV TAPSE: 3.6 CM (ref 1.5–2)
ECHO TV REGURGITANT MAX VELOCITY: 2.67 M/S
ECHO TV REGURGITANT PEAK GRADIENT: 28 MMHG

## 2021-12-16 NOTE — TELEPHONE ENCOUNTER
----- Message from Eliseo Owen NP sent at 12/16/2021 11:12 AM EST -----  Please call the patient and inform that echocardiogram is normal, ejection fraction is 55-60%. No concern for heart failure at this time. Valves working appropriately.     Thanks,  [employer]

## 2021-12-21 ENCOUNTER — TELEPHONE (OUTPATIENT)
Dept: CARDIOLOGY CLINIC | Age: 76
End: 2021-12-21

## 2021-12-21 NOTE — TELEPHONE ENCOUNTER
This writer attempted to contact patient in reference to Results of Echocardiogram. Voice message left for patient to call us back to  [phone]. Postcard sent to patient's address on chart.

## 2021-12-21 NOTE — TELEPHONE ENCOUNTER
Patient returning phone call, patient verified with 2 identifiers. Writer gave results from echocardiogram, patient voiced understanding.

## 2022-02-18 RX ORDER — HYDRALAZINE HYDROCHLORIDE 25 MG/1
TABLET, FILM COATED ORAL
Qty: 270 TABLET | Refills: 3 | Status: SHIPPED | OUTPATIENT
Start: 2022-02-18

## 2022-03-18 PROBLEM — E66.01 OBESITY, MORBID (HCC): Status: ACTIVE | Noted: 2018-03-21

## 2022-03-20 PROBLEM — E78.2 MIXED HYPERLIPIDEMIA: Status: ACTIVE | Noted: 2021-03-02

## 2022-04-07 ENCOUNTER — TELEPHONE (OUTPATIENT)
Dept: CARDIOLOGY CLINIC | Age: 77
End: 2022-04-07

## 2022-04-07 ENCOUNTER — TELEPHONE ANTICOAG (OUTPATIENT)
Dept: CARDIOLOGY CLINIC | Age: 77
End: 2022-04-07

## 2022-04-07 NOTE — TELEPHONE ENCOUNTER
Cardiac clearance, last progress notes and EKG faxed to [facility] to [phone].

## 2022-04-07 NOTE — TELEPHONE ENCOUNTER
Per Nimisha Duvall, NP  May be considered low risk from CV standpoint for upcoming procedure.  May hold Eliquis 2 days prior to procedure, resume afterwards.

## 2022-04-07 NOTE — TELEPHONE ENCOUNTER
Pre-Procedure Cardiac Clearance Request:    [facility]    Procedure Date: June 22  Procedure: Cononoscopy    Surgeon: Gerda Moore    Request for Interruption of Anticoagulants: Eliquis 5 mg    May stop anticoagulant 3 days prior and when to resume    Is patient cleared from a cardiac standpoint to proceed with upcoming procedure. Please advise.

## 2022-06-22 ENCOUNTER — HOSPITAL ENCOUNTER (OUTPATIENT)
Age: 77
Setting detail: OUTPATIENT SURGERY
Discharge: HOME | End: 2022-06-22
Attending: INTERNAL MEDICINE | Admitting: INTERNAL MEDICINE
Payer: MEDICARE

## 2022-06-22 ENCOUNTER — ANESTHESIA EVENT (OUTPATIENT)
Dept: ENDOSCOPY | Age: 77
End: 2022-06-22
Payer: MEDICARE

## 2022-06-22 ENCOUNTER — ANESTHESIA (OUTPATIENT)
Dept: ENDOSCOPY | Age: 77
End: 2022-06-22
Payer: MEDICARE

## 2022-06-22 VITALS
TEMPERATURE: 98.7 F | HEART RATE: 62 BPM | WEIGHT: 268 LBS | RESPIRATION RATE: 19 BRPM | SYSTOLIC BLOOD PRESSURE: 127 MMHG | DIASTOLIC BLOOD PRESSURE: 55 MMHG | BODY MASS INDEX: 40.62 KG/M2 | OXYGEN SATURATION: 97 % | HEIGHT: 68 IN

## 2022-06-22 PROCEDURE — 76060000031 HC ANESTHESIA FIRST 0.5 HR: Performed by: INTERNAL MEDICINE

## 2022-06-22 PROCEDURE — 74011000250 HC RX REV CODE- 250: Performed by: ANESTHESIOLOGY

## 2022-06-22 PROCEDURE — 77030039825 HC MSK NSL PAP SUPERNO2VA VYRM -B: Performed by: ANESTHESIOLOGY

## 2022-06-22 PROCEDURE — 76040000019: Performed by: INTERNAL MEDICINE

## 2022-06-22 PROCEDURE — 77030013992 HC SNR POLYP ENDOSC BSC -B: Performed by: INTERNAL MEDICINE

## 2022-06-22 PROCEDURE — 88305 TISSUE EXAM BY PATHOLOGIST: CPT

## 2022-06-22 PROCEDURE — 74011250636 HC RX REV CODE- 250/636: Performed by: INTERNAL MEDICINE

## 2022-06-22 PROCEDURE — 74011250636 HC RX REV CODE- 250/636: Performed by: ANESTHESIOLOGY

## 2022-06-22 PROCEDURE — 2709999900 HC NON-CHARGEABLE SUPPLY: Performed by: INTERNAL MEDICINE

## 2022-06-22 RX ORDER — SODIUM CHLORIDE 0.9 % (FLUSH) 0.9 %
5-40 SYRINGE (ML) INJECTION AS NEEDED
Status: DISCONTINUED | OUTPATIENT
Start: 2022-06-22 | End: 2022-06-22 | Stop reason: HOSPADM

## 2022-06-22 RX ORDER — PROPOFOL 10 MG/ML
INJECTION, EMULSION INTRAVENOUS AS NEEDED
Status: DISCONTINUED | OUTPATIENT
Start: 2022-06-22 | End: 2022-06-22 | Stop reason: HOSPADM

## 2022-06-22 RX ORDER — FLUMAZENIL 0.1 MG/ML
0.2 INJECTION INTRAVENOUS
Status: DISCONTINUED | OUTPATIENT
Start: 2022-06-22 | End: 2022-06-22 | Stop reason: HOSPADM

## 2022-06-22 RX ORDER — EPINEPHRINE 0.1 MG/ML
1 INJECTION INTRACARDIAC; INTRAVENOUS
Status: DISCONTINUED | OUTPATIENT
Start: 2022-06-22 | End: 2022-06-22 | Stop reason: HOSPADM

## 2022-06-22 RX ORDER — SODIUM CHLORIDE 9 MG/ML
75 INJECTION, SOLUTION INTRAVENOUS CONTINUOUS
Status: DISCONTINUED | OUTPATIENT
Start: 2022-06-22 | End: 2022-06-22 | Stop reason: HOSPADM

## 2022-06-22 RX ORDER — SODIUM CHLORIDE 0.9 % (FLUSH) 0.9 %
5-40 SYRINGE (ML) INJECTION EVERY 8 HOURS
Status: DISCONTINUED | OUTPATIENT
Start: 2022-06-22 | End: 2022-06-22 | Stop reason: HOSPADM

## 2022-06-22 RX ORDER — DEXTROMETHORPHAN/PSEUDOEPHED 2.5-7.5/.8
1.2 DROPS ORAL
Status: DISCONTINUED | OUTPATIENT
Start: 2022-06-22 | End: 2022-06-22 | Stop reason: HOSPADM

## 2022-06-22 RX ORDER — LIDOCAINE HYDROCHLORIDE 20 MG/ML
INJECTION, SOLUTION EPIDURAL; INFILTRATION; INTRACAUDAL; PERINEURAL AS NEEDED
Status: DISCONTINUED | OUTPATIENT
Start: 2022-06-22 | End: 2022-06-22 | Stop reason: HOSPADM

## 2022-06-22 RX ORDER — NALOXONE HYDROCHLORIDE 0.4 MG/ML
0.4 INJECTION, SOLUTION INTRAMUSCULAR; INTRAVENOUS; SUBCUTANEOUS
Status: DISCONTINUED | OUTPATIENT
Start: 2022-06-22 | End: 2022-06-22 | Stop reason: HOSPADM

## 2022-06-22 RX ORDER — ATROPINE SULFATE 0.1 MG/ML
0.5 INJECTION INTRAVENOUS
Status: DISCONTINUED | OUTPATIENT
Start: 2022-06-22 | End: 2022-06-22 | Stop reason: HOSPADM

## 2022-06-22 RX ADMIN — SODIUM CHLORIDE 75 ML/HR: 9 INJECTION, SOLUTION INTRAVENOUS at 15:05

## 2022-06-22 RX ADMIN — LIDOCAINE HYDROCHLORIDE 40 MG: 20 INJECTION, SOLUTION EPIDURAL; INFILTRATION; INTRACAUDAL; PERINEURAL at 15:33

## 2022-06-22 RX ADMIN — PROPOFOL 200 MG: 10 INJECTION, EMULSION INTRAVENOUS at 15:49

## 2022-06-22 NOTE — ANESTHESIA POSTPROCEDURE EVALUATION
Procedure(s):  COLONOSCOPY  ENDOSCOPIC POLYPECTOMY. total IV anesthesia    Anesthesia Post Evaluation        Patient location during evaluation: PACU  Note status: Adequate. Level of consciousness: responsive to verbal stimuli and sleepy but conscious  Pain management: satisfactory to patient  Airway patency: patent  Anesthetic complications: no  Cardiovascular status: acceptable  Respiratory status: acceptable  Hydration status: acceptable  Comments: +Post-Anesthesia Evaluation and Assessment    Patient: Karel Torres MRN: [id number]  SSN: [id number]   YOB: 1945  Age: 78 y.o. Sex: male      Cardiovascular Function/Vital Signs    /62   Pulse 69   Temp 37.2 °C (99 °F)   Resp 16   Ht 5' 8\" (1.727 m)   Wt 121.6 kg (268 lb)   SpO2 97%   BMI 40.75 kg/m²     Patient is status post Procedure(s):  COLONOSCOPY  ENDOSCOPIC POLYPECTOMY. Nausea/Vomiting: Controlled. Postoperative hydration reviewed and adequate. Pain:  Pain Scale 1: Visual (06/22/22 1601)  Pain Intensity 1: 0 (06/22/22 1601)   Managed. Neurological Status: At baseline. Mental Status and Level of Consciousness: Arousable. Pulmonary Status:   O2 Device: None (Room air) (06/22/22 1601)   Adequate oxygenation and airway patent. Complications related to anesthesia: None    Post-anesthesia assessment completed. No concerns. Signed By: Susan Gallo DO    6/22/2022  Post anesthesia nausea and vomiting:  controlled      INITIAL Post-op Vital signs:   Vitals Value Taken Time   /59 06/22/22 1603   Temp     Pulse 63 06/22/22 1604   Resp 23 06/22/22 1604   SpO2 96 % 06/22/22 1604   Vitals shown include unvalidated device data.

## 2022-06-22 NOTE — H&P
Gastroenterology Outpatient History and Physical    Patient: Nataliia Levimary    Physician: Missy Simmons MD    Chief Complaint: H/o colon polyps  History of Present Illness: No GI complaints    History:  Past Medical History:   Diagnosis Date    Adverse effect of anesthesia     slow to wake    Arrhythmia     a. fib    Arthritis     hands    Cardiomegaly     Cardiomyopathy in other diseases classified elsewhere     Diabetes ([facility] [address]     BORDERLINE    Difficult intubation     Hyperlipidemia     Hypertension     Other acute and subacute form of ischemic heart disease     Other ill-defined conditions(799.89)     ATHEROSCLEROSIS/HYPERLIPIDEMIA    PAF (paroxysmal atrial fibrillation) ([facility] [address]     Shortness of breath     Unspecified sleep apnea     USES C-PAP/pt dosent know dr name      Past Surgical History:   Procedure Laterality Date    COLONOSCOPY N/A 1/13/2017    COLONOSCOPY performed [facility]    COLONOSCOPY N/A 12/21/2018    COLONOSCOPY performed by Walker Andre MD at [address] [place] [address]  1/13/2017         HX GI      COLONOSCOPY    HX HERNIA REPAIR Left     LEFT INGUINAL HERNIA    HX KNEE REPLACEMENT Bilateral 2006    BILAT TKR    CA COLSC FLX W/REMOVAL LESION BY HOT BX FORCEPS  5/14/2014           Social History     Socioeconomic History    Marital status:    Tobacco Use    Smoking status: Never Smoker    Smokeless tobacco: Never Used   Vaping Use    Vaping Use: Never used   Substance and Sexual Activity    Alcohol use: No     Alcohol/week: 0.0 standard drinks    Drug use: No      Family History   Problem Relation Age of Onset    Heart Disease Mother         w/pacemaker    Diabetes Father     Diabetes Sister       Patient Active Problem List   Diagnosis Code    Status post total knee replacement Z96.659    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 Authorizing Provider   hydrALAZINE (APRESOLINE) 25 mg tablet TAKE 1 TABLET BY MOUTH THREE TIMES DAILY 2/18/22  Yes Moises New NP   gabapentin (Neurontin) 100 mg capsule May take 1 capsule up to three times daily for pain 10/22/21  Yes Zack Carias   carvediloL (COREG) 6.25 mg tablet TAKE 1 TABLET BY MOUTH TWICE DAILY 5/17/21  Yes Moises New NP   sildenafil, pulm. hypertension, (REVATIO) 20 mg tablet Take 20 mg by mouth as needed. 1/16/20  Yes Provider, Historical   metFORMIN (GLUCOPHAGE) 500 mg tablet Take 500 mg by mouth two (2) times daily (with meals). 9/1/18  Yes Provider, Historical   cpap machine kit by Does Not Apply route nightly. Yes Provider, Historical   simvastatin (ZOCOR) 20 mg tablet Take  by mouth nightly. Yes Provider, Historical   potassium chloride SR (KLOR-CON 10) 10 mEq tablet Take 20 mEq by mouth daily. Yes Provider, Historical   lisinopril (PRINIVIL, ZESTRIL) 40 mg tablet Take 40 mg by mouth daily. Yes Provider, Historical   amlodipine (NORVASC) 10 mg tablet Take  by mouth daily. Yes Provider, Historical   hydrochlorothiazide (HYDRODIURIL) 25 mg tablet Take 25 mg by mouth daily. Yes Provider, Historical   apixaban (Eliquis) 5 mg tablet TAKE 1 TABLET BY MOUTH TWICE DAILY 4/22/22   Moises New NP   aspirin delayed-release (ST. ALEXANDER ASPIRIN) 81 mg tablet Take  by mouth daily.       Provider, Historical     Physical Exam:   Vital Signs: Blood pressure (!) 172/59, pulse 63, temperature 99 °F (37.2 °C), resp. rate 11, height 5' 8\" (1.727 m), weight 121.6 kg (268 lb), SpO2 99 %.   General: well developed, well nourished   HEENT: unremarkable   Heart: regular rhythm no mumur    Lungs: clear   Abdominal:  benign   Neurological: unremarkable   Extremities: no edema     Findings/Diagnosis: H/o colon polyps  Plan of Care/Planned Procedure: Colonoscopy with conscious/deep sedation    Signed:  Fadumo Sanderson MD 6/22/2022

## 2022-06-22 NOTE — ANESTHESIA PREPROCEDURE EVALUATION
Anesthetic History     Increased risk of difficult airway          Review of Systems / Medical History  Patient summary reviewed, nursing notes reviewed and pertinent labs reviewed    Pulmonary        Sleep apnea: CPAP           Neuro/Psych   Within defined limits           Cardiovascular    Hypertension        Dysrhythmias : atrial fibrillation  CAD    Exercise tolerance: >4 METS  Comments: EKG: Marked sinus  Bradycardia  -Short MN syndrome  - occasional PAC  EF 55-60%   Hx PAF   GI/Hepatic/Renal  Within defined limits              Endo/Other    Diabetes    Morbid obesity and arthritis     Other Findings          Anesthetic History     Increased risk of difficult airway          Review of Systems / Medical History  Patient summary reviewed, nursing notes reviewed and pertinent labs reviewed    Pulmonary        Sleep apnea: CPAP           Neuro/Psych   Within defined limits           Cardiovascular    Hypertension        Dysrhythmias : atrial fibrillation  CAD    Exercise tolerance: >4 METS  Comments: Echo last year showed normal EF and normal valves   GI/Hepatic/Renal  Within defined limits              Endo/Other    Diabetes    Morbid obesity     Other Findings            Physical Exam    Airway  Mallampati: III  TM Distance: 4 - 6 cm  Neck ROM: decreased range of motion   Mouth opening: Normal     Cardiovascular  Regular rate and rhythm,  S1 and S2 normal,  no murmur, click, rub, or gallop  Rhythm: regular  Rate: normal         Dental  No notable dental hx       Pulmonary  Breath sounds clear to auscultation               Abdominal  GI exam deferred       Other Findings            Anesthetic Plan    ASA: 3  Anesthesia type: MAC            Anesthetic plan and risks discussed with: Patient              Physical Exam    Airway  Mallampati: III  TM Distance: 4 - 6 cm  Neck ROM: decreased range of motion   Mouth opening: Normal     Cardiovascular  Regular rate and rhythm,  S1 and S2 normal,  no murmur, click, rub, or gallop  Rhythm: regular  Rate: normal         Dental    Dentition: Lower partial plate     Pulmonary  Breath sounds clear to auscultation               Abdominal  GI exam deferred       Other Findings            Anesthetic Plan    ASA: 3  Anesthesia type: total IV anesthesia and general            Anesthetic plan and risks discussed with: Patient      Propofol MAC/Supernova

## 2022-06-22 NOTE — DISCHARGE INSTRUCTIONS
Karel Torres  [id number]  1945    COLON DISCHARGE INSTRUCTIONS  Discomfort:  Redness at IV site- apply warm compress to area; if redness or soreness persist- contact your physician  There may be a slight amount of blood passed from the rectum  Gaseous discomfort- walking, belching will help relieve any discomfort  You may not operate a vehicle for 12 hours  You may not engage in an occupation involving machinery or appliances for rest of today  You may not drink alcoholic beverages for at least 12 hours  Avoid making any critical decisions for at least 24 hour  DIET:   Regular diet. - however -  remember your colon is empty and a heavy meal will produce gas. Avoid these foods:  vegetables, fried / greasy foods, carbonated drinks for today  MEDICATION:  Per Medication Reconciliation  Resume Eliquis on 6/28       ACTIVITY:  You may not resume your normal daily activities until tomorrow AM; it is recommended that you spend the remainder of the day resting -  avoid any strenuous activity. CALL M.D. ANY SIGN OF:   Increasing pain, nausea, vomiting  Abdominal distension (swelling)  New increased bleeding (oral or rectal)  Fever (chills)  Pain in chest area  Bloody discharge from nose or mouth  Shortness of breath    You may not  take any Advil,  Ibuprofen, Motrin, Aleve, or Goodys for 10 days, ONLY  Tylenol as needed for pain. IMPRESSION:  Impression:   1. Three 4 mm to 10 mm sessile polyps in transverse colon. Removed by cold snare polypectomy  2. Medium sized internal hemorrhoids seen on retroflexion  3. Otherwise normal colonoscopy through to the cecum    Recommendations:   1. Follow up pathology  2.  Repeat colonoscopy in 3 years with continued extended bowel preparation    Follow-up Instructions:   Call Dr. Marcela River for the results of procedure / biopsy in 7-10 days  Telephone # [phone]      Isa Muller MD

## 2022-06-22 NOTE — PERIOP NOTES
Endoscope was pre-cleaned at bedside immediately following procedure by AURELIANO Branch       Medications     lidocaine (PF) 2% (mg)     Date/Time Rate/Dose/Volume Action Route Admin User Audit       06/22/22  1533 40 mg Given IntraVENous Zahraa SMITH DO            propofol 10 mg/mL (mg)     Date/Time Rate/Dose/Volume Action Route Admin User Audit       06/22/22  1549 200 mg Given IntraVENous Zahraa SMITH DO      Comment: titrated over case            0.9% sodium chloride infusion (mL)     Date/Time Rate/Dose/Volume Action Route Admin User Audit       06/22/22  1529 75 mL/hr Rate Verify IntraVENous Zahraa wilks DO       [address] , [place]      Comment: Switch to gravity       1547 300 mL Restarted IntraVENous Julius Morris DO

## 2022-06-22 NOTE — ROUTINE PROCESS
[facility]  2/70/1129  [id number]    Situation:  Procedure: Procedure(s):  COLONOSCOPY  ENDOSCOPIC POLYPECTOMY    Background:    Preoperative diagnosis: LONG TERM CURRENT USE OF ANTICOGULANT, HX OF COLONIC POLYPS  Postoperative diagnosis: Polyps, Hemorrhoids    :  Dr. Jeanette Campos  Assistant(s): Endoscopy Technician-1: Carola Lefort  Endoscopy RN-1: Karmen Houston    Specimens:   ID Type Source Tests Collected by Time Destination   1 : polyps Preservative Colon, Transverse  Annamaria Cleaning MD 6/22/2022 1542 Pathology     H. Pylori  no    Assessment:  Intra-procedure medications     Anesthesia gave intra-procedure sedation and medications, see anesthesia flow sheet yes    Intravenous fluids: NS@ KVO     Vital signs stable     Abdominal assessment: round and soft     Recommendation:  Discharge patient per MD order.   Family or Friend   Permission to share finding with family or friend yes

## 2022-06-22 NOTE — PROCEDURES
NAME:  Opal Stevenson   :      MRN:   [id number]     Date/Time:  2022 3:49 PM    Colonoscopy Operative Report    Procedure Type:   Colonoscopy with polypectomy (cold snare)     Indications:     Personal history of colon polyps (screening only)  Pre-operative Diagnosis: see indication above  Post-operative Diagnosis:  See findings below  :  Ailyn Parra MD  Referring Provider: --Antelmo Walton MD    Exam:  Airway: clear, no airway problems anticipated  Heart: RRR, without gallops or rubs  Lungs: clear bilaterally without wheezes, crackles, or rhonchi  Abdomen: soft, nontender, nondistended, bowel sounds present  Mental Status: awake, alert and oriented to person, place and time    Sedation:  MAC anesthesia Propofol  Procedure Details:  After informed consent was obtained with all risks and benefits of procedure explained and preoperative exam completed, the patient was taken to the endoscopy suite and placed in the left lateral decubitus position. Upon sequential sedation as per above, a digital rectal exam was performed demonstrating internal hemorrhoids. The Olympus videocolonoscope  was inserted in the rectum and carefully advanced to the cecum, which was identified by the ileocecal valve and appendiceal orifice. The quality of preparation was good. The colonoscope was slowly withdrawn with careful evaluation between folds. Retroflexion in the rectum was completed demonstrating internal hemorrhoids. Findings:  1. Three 4 mm to 10 mm sessile polyps in transverse colon. Removed by cold snare polypectomy  2. Medium sized internal hemorrhoids seen on retroflexion  3. Otherwise normal colonoscopy through to the cecum    Specimen Removed:  1. Transverse colon polyps  Complications: None. EBL:  None. Impression:   1. Three 4 mm to 10 mm sessile polyps in transverse colon. Removed by cold snare polypectomy  2. Medium sized internal hemorrhoids seen on retroflexion  3. Otherwise normal colonoscopy through to the cecum    Recommendations:   1. Follow up pathology  2. Repeat colonoscopy in 3 years with continued extended bowel preparation    Discharge Disposition:  Home in the company of a  when able to ambulate.       Julio César Chowdhury MD

## 2022-08-05 ENCOUNTER — HOSPITAL ENCOUNTER (EMERGENCY)
Age: 77
Discharge: HOME | End: 2022-08-06
Attending: EMERGENCY MEDICINE
Payer: MEDICARE

## 2022-08-05 DIAGNOSIS — R06.6 HICCUPS: Primary | ICD-10-CM

## 2022-08-05 LAB
ALBUMIN SERPL-MCNC: 3.3 G/DL (ref 3.5–5)
ALBUMIN/GLOB SERPL: 0.7 {RATIO} (ref 1.1–2.2)
ALP SERPL-CCNC: 54 U/L (ref 45–117)
ALT SERPL-CCNC: 33 U/L (ref 12–78)
ANION GAP SERPL CALC-SCNC: 4 MMOL/L (ref 5–15)
AST SERPL-CCNC: 37 U/L (ref 15–37)
BASOPHILS # BLD: 0 K/UL (ref 0–0.1)
BASOPHILS NFR BLD: 0 % (ref 0–1)
BILIRUB SERPL-MCNC: 0.2 MG/DL (ref 0.2–1)
BUN SERPL-MCNC: 15 MG/DL (ref 6–20)
BUN/CREAT SERPL: 14 (ref 12–20)
CALCIUM SERPL-MCNC: 8.8 MG/DL (ref 8.5–10.1)
CHLORIDE SERPL-SCNC: 104 MMOL/L (ref 97–108)
CO2 SERPL-SCNC: 32 MMOL/L (ref 21–32)
CREAT SERPL-MCNC: 1.05 MG/DL (ref 0.7–1.3)
DIFFERENTIAL METHOD BLD: ABNORMAL
EOSINOPHIL # BLD: 0.1 K/UL (ref 0–0.4)
EOSINOPHIL NFR BLD: 2 % (ref 0–7)
ERYTHROCYTE [DISTWIDTH] IN BLOOD BY AUTOMATED COUNT: 17.1 % (ref 11.5–14.5)
GLOBULIN SER CALC-MCNC: 4.6 G/DL (ref 2–4)
GLUCOSE SERPL-MCNC: 146 MG/DL (ref 65–100)
HCT VFR BLD AUTO: 41.5 % (ref 36.6–50.3)
HGB BLD-MCNC: 12.9 G/DL (ref 12.1–17)
IMM GRANULOCYTES # BLD AUTO: 0 K/UL (ref 0–0.04)
IMM GRANULOCYTES NFR BLD AUTO: 0 % (ref 0–0.5)
LIPASE SERPL-CCNC: 115 U/L (ref 73–393)
LYMPHOCYTES # BLD: 1.1 K/UL (ref 0.8–3.5)
LYMPHOCYTES NFR BLD: 23 % (ref 12–49)
MCH RBC QN AUTO: 25.6 PG (ref 26–34)
MCHC RBC AUTO-ENTMCNC: 31.1 G/DL (ref 30–36.5)
MCV RBC AUTO: 82.3 FL (ref 80–99)
MONOCYTES # BLD: 1.1 K/UL (ref 0–1)
MONOCYTES NFR BLD: 23 % (ref 5–13)
NEUTS SEG # BLD: 2.3 K/UL (ref 1.8–8)
NEUTS SEG NFR BLD: 52 % (ref 32–75)
NRBC # BLD: 0 K/UL (ref 0–0.01)
NRBC BLD-RTO: 0 PER 100 WBC
PLATELET # BLD AUTO: 145 K/UL (ref 150–400)
PMV BLD AUTO: 10.1 FL (ref 8.9–12.9)
POTASSIUM SERPL-SCNC: 3.4 MMOL/L (ref 3.5–5.1)
PROT SERPL-MCNC: 7.9 G/DL (ref 6.4–8.2)
RBC # BLD AUTO: 5.04 M/UL (ref 4.1–5.7)
RBC MORPH BLD: ABNORMAL
SODIUM SERPL-SCNC: 140 MMOL/L (ref 136–145)
TROPONIN-HIGH SENSITIVITY: 28 NG/L (ref 0–76)
WBC # BLD AUTO: 4.6 K/UL (ref 4.1–11.1)

## 2022-08-05 PROCEDURE — 74011250637 HC RX REV CODE- 250/637: Performed by: EMERGENCY MEDICINE

## 2022-08-05 PROCEDURE — 83690 ASSAY OF LIPASE: CPT

## 2022-08-05 PROCEDURE — 99285 EMERGENCY DEPT VISIT HI MDM: CPT

## 2022-08-05 PROCEDURE — 84484 ASSAY OF TROPONIN QUANT: CPT

## 2022-08-05 PROCEDURE — 93005 ELECTROCARDIOGRAM TRACING: CPT

## 2022-08-05 PROCEDURE — 96374 THER/PROPH/DIAG INJ IV PUSH: CPT

## 2022-08-05 PROCEDURE — 80053 COMPREHEN METABOLIC PANEL: CPT

## 2022-08-05 PROCEDURE — 85025 COMPLETE CBC W/AUTO DIFF WBC: CPT

## 2022-08-05 PROCEDURE — 36415 COLL VENOUS BLD VENIPUNCTURE: CPT

## 2022-08-05 PROCEDURE — 74011000250 HC RX REV CODE- 250: Performed by: EMERGENCY MEDICINE

## 2022-08-05 RX ADMIN — ALUMINUM HYDROXIDE AND MAGNESIUM HYDROXIDE 40 ML: 200; 200 SUSPENSION ORAL at 23:41

## 2022-08-06 ENCOUNTER — APPOINTMENT (OUTPATIENT)
Dept: GENERAL RADIOLOGY | Age: 77
End: 2022-08-06
Attending: EMERGENCY MEDICINE
Payer: MEDICARE

## 2022-08-06 VITALS
BODY MASS INDEX: 40.33 KG/M2 | HEART RATE: 62 BPM | WEIGHT: 266.1 LBS | TEMPERATURE: 99.3 F | HEIGHT: 68 IN | OXYGEN SATURATION: 92 % | DIASTOLIC BLOOD PRESSURE: 69 MMHG | RESPIRATION RATE: 16 BRPM | SYSTOLIC BLOOD PRESSURE: 134 MMHG

## 2022-08-06 LAB
ATRIAL RATE: 57 BPM
CALCULATED P AXIS, ECG09: 90 DEGREES
CALCULATED R AXIS, ECG10: -17 DEGREES
CALCULATED T AXIS, ECG11: 30 DEGREES
DIAGNOSIS, 93000: NORMAL
P-R INTERVAL, ECG05: 134 MS
Q-T INTERVAL, ECG07: 452 MS
QRS DURATION, ECG06: 104 MS
QTC CALCULATION (BEZET), ECG08: 439 MS
VENTRICULAR RATE, ECG03: 57 BPM

## 2022-08-06 PROCEDURE — C9113 INJ PANTOPRAZOLE SODIUM, VIA: HCPCS | Performed by: EMERGENCY MEDICINE

## 2022-08-06 PROCEDURE — 74011000250 HC RX REV CODE- 250: Performed by: EMERGENCY MEDICINE

## 2022-08-06 PROCEDURE — 96375 TX/PRO/DX INJ NEW DRUG ADDON: CPT

## 2022-08-06 PROCEDURE — 74011250636 HC RX REV CODE- 250/636: Performed by: EMERGENCY MEDICINE

## 2022-08-06 PROCEDURE — 71046 X-RAY EXAM CHEST 2 VIEWS: CPT

## 2022-08-06 PROCEDURE — 96374 THER/PROPH/DIAG INJ IV PUSH: CPT

## 2022-08-06 RX ORDER — FAMOTIDINE 20 MG/1
20 TABLET, FILM COATED ORAL 2 TIMES DAILY
Qty: 20 TABLET | Refills: 0 | Status: SHIPPED | OUTPATIENT
Start: 2022-08-06 | End: 2022-08-16

## 2022-08-06 RX ORDER — METOCLOPRAMIDE HYDROCHLORIDE 5 MG/ML
10 INJECTION INTRAMUSCULAR; INTRAVENOUS
Status: COMPLETED | OUTPATIENT
Start: 2022-08-06 | End: 2022-08-06

## 2022-08-06 RX ORDER — DIPHENHYDRAMINE HYDROCHLORIDE 50 MG/ML
25 INJECTION, SOLUTION INTRAMUSCULAR; INTRAVENOUS
Status: COMPLETED | OUTPATIENT
Start: 2022-08-06 | End: 2022-08-06

## 2022-08-06 RX ADMIN — DIPHENHYDRAMINE HYDROCHLORIDE 25 MG: 50 INJECTION, SOLUTION INTRAMUSCULAR; INTRAVENOUS at 00:53

## 2022-08-06 RX ADMIN — SODIUM CHLORIDE 40 MG: 9 INJECTION, SOLUTION INTRAMUSCULAR; INTRAVENOUS; SUBCUTANEOUS at 00:03

## 2022-08-06 RX ADMIN — METOCLOPRAMIDE 10 MG: 5 INJECTION, SOLUTION INTRAMUSCULAR; INTRAVENOUS at 00:53

## 2022-08-06 NOTE — ED NOTES
Patient discharged by Real Cordova MD  - pt sent to the front lobby, with strong and steady gait, no acute distress noted at time of discharge - Discharge information / home RX / and reasons to return to the ED were reviewed by the ED provider.

## 2022-08-06 NOTE — ED PROVIDER NOTES
EMERGENCY DEPARTMENT HISTORY AND PHYSICAL EXAM      Date: 8/5/2022  Patient Name: Kulwinder Sandhu    History of Presenting Illness     Chief Complaint   Patient presents with    Hiccups     Hiccups For three days with reflux. And belching. History Provided By: Patient    HPI: Kulwinder Sandhu, 78 y.o. male with PMHx as noted below presents the emergency department chief complaint of hiccups. Patient states he had constant hiccups for the last 3 days. Patient states that he is able to sleep at night but was soon as he wakes up in the morning he notes the hiccups again. He also reports having a burning sensation in his epigastrium and lower chest and frequent belching. He notes that the hiccups have been constant and have been mildly bothersome. Otherwise denying any pain at this time or radiation of symptoms. Pt denies any other alleviating or exacerbating factors. Additionally, pt specifically denies any recent fever, chills, headache, nausea, vomiting, abdominal pain, CP, SOB, lightheadedness, dizziness, numbness, weakness, BLE swelling, heart palpitations, urinary sxs, diarrhea, constipation, melena, hematochezia, cough, or congestion. PCP: Radha Ravi MD    Current Outpatient Medications   Medication Sig Dispense Refill    famotidine (Pepcid) 20 mg tablet Take 1 Tablet by mouth two (2) times a day for 10 days. 20 Tablet 0    apixaban (Eliquis) 5 mg tablet TAKE 1 TABLET BY MOUTH TWICE DAILY 180 Tablet 0    hydrALAZINE (APRESOLINE) 25 mg tablet TAKE 1 TABLET BY MOUTH THREE TIMES DAILY 270 Tablet 3    gabapentin (Neurontin) 100 mg capsule May take 1 capsule up to three times daily for pain 15 Capsule 0    carvediloL (COREG) 6.25 mg tablet TAKE 1 TABLET BY MOUTH TWICE DAILY 180 Tab 3    sildenafil, pulm. hypertension, (REVATIO) 20 mg tablet Take 20 mg by mouth as needed. metFORMIN (GLUCOPHAGE) 500 mg tablet Take 500 mg by mouth two (2) times daily (with meals).       cpap machine kit by Does Not Apply route nightly. simvastatin (ZOCOR) 20 mg tablet Take  by mouth nightly. potassium chloride SR (KLOR-CON 10) 10 mEq tablet Take 20 mEq by mouth daily. aspirin delayed-release (ST. ALEXANDER ASPIRIN) 81 mg tablet Take  by mouth daily. lisinopril (PRINIVIL, ZESTRIL) 40 mg tablet Take 40 mg by mouth daily. amlodipine (NORVASC) 10 mg tablet Take  by mouth daily. hydrochlorothiazide (HYDRODIURIL) 25 mg tablet Take 25 mg by mouth daily.          Past History     Past Medical History:  Past Medical History:   Diagnosis Date    Adverse effect of anesthesia     slow to wake    Arrhythmia     a. fib    Arthritis     hands    Cardiomegaly     Cardiomyopathy in other diseases classified elsewhere     Diabetes ([facility] [address]     BORDERLINE    Difficult intubation     Hyperlipidemia     Hypertension     Other acute and subacute form of ischemic heart disease     Other ill-defined conditions(799.89)     ATHEROSCLEROSIS/HYPERLIPIDEMIA    PAF (paroxysmal atrial fibrillation) (HCC)     Shortness of breath     Unspecified sleep apnea     USES C-PAP/pt dosent know dr name       Past Surgical History:  Past Surgical History:   Procedure Laterality Date    COLONOSCOPY N/A 1/13/2017    COLONOSCOPY performed by Halie Cisneros MD at [facility] ENDOSCOPY    COLONOSCOPY N/A 12/21/2018    COLONOSCOPY performed by Onofre Moore MD at [facility] ENDOSCOPY    COLONOSCOPY N/A 6/22/2022    COLONOSCOPY performed by Onofre Moore MD at [facility] ENDOSCOPY    COLONOSCOPY,REMYOHANNES REZA,SNARE  1/13/2017         HX GI      COLONOSCOPY    HX HERNIA REPAIR Left     LEFT INGUINAL HERNIA    HX KNEE REPLACEMENT Bilateral 2006    BILAT TKR    MA COLSC FLX W/REMOVAL LESION BY HOT BX FORCEPS  5/14/2014            Family History:  Family History   Problem Relation Age of Onset    Heart Disease Mother         w/pacemaker    Diabetes Father     Diabetes Sister        Social History:  Social History     Tobacco Use    Smoking status: Never Smokeless tobacco: Never   Vaping Use    Vaping Use: Never used   Substance Use Topics    Alcohol use: No     Alcohol/week: 0.0 standard drinks    Drug use: No       Allergies:  No Known Allergies      Review of Systems   Review of Systems  Constitutional: Negative for fever, chills, and fatigue. HENT: Negative for congestion, sore throat, rhinorrhea, sneezing and neck stiffness   Eyes: Negative for discharge and redness. Respiratory: Negative for  shortness of breath, wheezing   Cardiovascular: Negative for chest pain, palpitations   Gastrointestinal: Negative for nausea, vomiting, abdominal pain, constipation, diarrhea and blood in stool. Genitourinary: Negative for dysuria, hematuria, flank pain, decreased urine volume, discharge,   Musculoskeletal: Negative for myalgias or joint pain . Skin: Negative for rash or lesions . Neurological: Negative weakness, light-headedness, numbness and headaches. Physical Exam   Physical Exam    GENERAL: alert and oriented, no acute distress  EYES: PEERL, No injection, discharge or icterus. ENT: Mucous membranes pink and moist.  NECK: Supple  LUNGS: Airway patent. Non-labored respirations. Breath sounds clear with good air entry bilaterally. HEART: Regular rate and rhythm. No peripheral edema  ABDOMEN: Non-distended and non-tender, without guarding or rebound.   SKIN:  warm, dry  MSK/EXTREMITIES: Without swelling, tenderness or deformity, symmetric with normal ROM  NEUROLOGICAL: Alert, oriented      Diagnostic Study Results     Labs -     Recent Results (from the past 12 hour(s))   EKG, 12 LEAD, INITIAL    Collection Time: 08/05/22  7:02 PM   Result Value Ref Range    Ventricular Rate 57 BPM    Atrial Rate 57 BPM    P-R Interval 134 ms    QRS Duration 104 ms    Q-T Interval 452 ms    QTC Calculation (Bezet) 439 ms    Calculated P Axis 90 degrees    Calculated R Axis -17 degrees    Calculated T Axis 30 degrees    Diagnosis       Sinus bradycardia with premature atrial complexes  Nonspecific ST and T wave abnormality  When compared with ECG of 27-DEC-2010 14:39,  QRS duration has decreased  ST now depressed in Anterior leads  Nonspecific T wave abnormality, improved in Lateral leads     CBC WITH AUTOMATED DIFF    Collection Time: 08/05/22  7:06 PM   Result Value Ref Range    WBC 4.6 4.1 - 11.1 K/uL    RBC 5.04 4.10 - 5.70 M/uL    HGB 12.9 12.1 - 17.0 g/dL    HCT 41.5 36.6 - 50.3 %    MCV 82.3 80.0 - 99.0 FL    MCH 25.6 (L) 26.0 - 34.0 PG    MCHC 31.1 30.0 - 36.5 g/dL    RDW 17.1 (H) 11.5 - 14.5 %    PLATELET 285 (L) 448 - 400 K/uL    MPV 10.1 8.9 - 12.9 FL    NRBC 0.0 0  WBC    ABSOLUTE NRBC 0.00 0.00 - 0.01 K/uL    NEUTROPHILS 52 32 - 75 %    LYMPHOCYTES 23 12 - 49 %    MONOCYTES 23 (H) 5 - 13 %    EOSINOPHILS 2 0 - 7 %    BASOPHILS 0 0 - 1 %    IMMATURE GRANULOCYTES 0 0.0 - 0.5 %    ABS. NEUTROPHILS 2.3 1.8 - 8.0 K/UL    ABS. LYMPHOCYTES 1.1 0.8 - 3.5 K/UL    ABS. MONOCYTES 1.1 (H) 0.0 - 1.0 K/UL    ABS. EOSINOPHILS 0.1 0.0 - 0.4 K/UL    ABS. BASOPHILS 0.0 0.0 - 0.1 K/UL    ABS. IMM. GRANS. 0.0 0.00 - 0.04 K/UL    DF SMEAR SCANNED      RBC COMMENTS NORMOCYTIC, NORMOCHROMIC     METABOLIC PANEL, COMPREHENSIVE    Collection Time: 08/05/22  7:06 PM   Result Value Ref Range    Sodium 140 136 - 145 mmol/L    Potassium 3.4 (L) 3.5 - 5.1 mmol/L    Chloride 104 97 - 108 mmol/L    CO2 32 21 - 32 mmol/L    Anion gap 4 (L) 5 - 15 mmol/L    Glucose 146 (H) 65 - 100 mg/dL    BUN 15 6 - 20 MG/DL    Creatinine 1.05 0.70 - 1.30 MG/DL    BUN/Creatinine ratio 14 12 - 20      GFR est AA >60 >60 ml/min/1.73m2    GFR est non-AA >60 >60 ml/min/1.73m2    Calcium 8.8 8.5 - 10.1 MG/DL    Bilirubin, total 0.2 0.2 - 1.0 MG/DL    ALT (SGPT) 33 12 - 78 U/L    AST (SGOT) 37 15 - 37 U/L    Alk.  phosphatase 54 45 - 117 U/L    Protein, total 7.9 6.4 - 8.2 g/dL    Albumin 3.3 (L) 3.5 - 5.0 g/dL    Globulin 4.6 (H) 2.0 - 4.0 g/dL    A-G Ratio 0.7 (L) 1.1 - 2.2     LIPASE    Collection Time: 08/05/22 7:06 PM   Result Value Ref Range    Lipase 115 73 - 393 U/L   TROPONIN-HIGH SENSITIVITY    Collection Time: 08/05/22  7:06 PM   Result Value Ref Range    Troponin-High Sensitivity 28 0 - 76 ng/L       Radiologic Studies -   XR CHEST PA LAT   Final Result   No acute intrathoracic disease. CT Results  (Last 48 hours)      None          CXR Results  (Last 48 hours)                 08/06/22 0026  XR CHEST PA LAT Final result    Impression:  No acute intrathoracic disease. Narrative:  Clinical history: intractable hiccups   INDICATION:   intractable hiccups   COMPARISON: 2010       FINDINGS:    PA and lateral views of the chest are obtained. The cardiopericardial silhouette is within normal limits. There is no pleural   effusion, pneumothorax or focal consolidation present. Extensive chronic   radiopaque density in the left axillary region. Medical Decision Making     IMonae MD am the first provider for this patient and am the attending of record for this patient encounter. I reviewed the vital signs, available nursing notes, past medical history, past surgical history, family history and social history. Vital Signs-Reviewed the patient's vital signs. Patient Vitals for the past 12 hrs:   Pulse BP SpO2   08/06/22 0154 62 134/69 92 %   08/06/22 0054 -- 138/71 94 %   08/06/22 0024 -- (!) 155/64 94 %   08/06/22 0009 -- (!) 169/69 95 %   08/05/22 2324 -- (!) 157/74 93 %   08/05/22 2254 -- (!) 147/74 93 %         EKG interpretation: (Preliminary)  Rhythm: normal sinus rhythm; and regular . Rate (approx.): 57; Axis: normal; P wave: normal; QRS interval: normal ; ST/T wave: Nonspecific changes. Was interpreted by Dennie Cushing, MD,ED Provider. Records Reviewed: Nursing Notes and Old Medical Records    Provider Notes (Medical Decision Making): On presentation, the patient is well appearing, in no acute distress with normal vital signs.   Based on my history and exam the differential diagnosis for this patient includes intractable hiccups, gastritis/GERD, malignancy, infection, other diaphragmatic irritation, ACS. Basic work-up in the ED was overall reassuring, nondiagnostic. Patient given GI cocktail as well as Reglan, did have resolution hiccups. On reevaluation patient was entirely asymptomatic felt stable for discharge at this time, possible etiologies would be gastritis/GERD. ED Course:   Initial assessment performed. The patients presenting problems have been discussed, and they are in agreement with the care plan formulated and outlined with them. I have encouraged them to ask questions as they arise throughout their visit. PROGRESS  Artice Pranav Oneill's  results have been reviewed with him. He has been counseled regarding his diagnosis. He verbally conveys understanding and agreement of the signs, symptoms, diagnosis, treatment and prognosis and additionally agrees to follow up as recommended with Dr. Kristan Smith MD in 24 - 48 hours. He also agrees with the care-plan and conveys that all of his questions have been answered. I have also put together some discharge instructions for him that include: 1) educational information regarding their diagnosis, 2) how to care for their diagnosis at home, as well a 3) list of reasons why they would want to return to the ED prior to their follow-up appointment, should their condition change. Disposition:  home    PLAN:  1. Discharge Medication List as of 8/6/2022  2:39 AM        START taking these medications    Details   famotidine (Pepcid) 20 mg tablet Take 1 Tablet by mouth two (2) times a day for 10 days. , Normal, Disp-20 Tablet, R-0           CONTINUE these medications which have NOT CHANGED    Details   apixaban (Eliquis) 5 mg tablet TAKE 1 TABLET BY MOUTH TWICE DAILY, Normal, Disp-180 Tablet, R-0      hydrALAZINE (APRESOLINE) 25 mg tablet TAKE 1 TABLET BY MOUTH THREE TIMES DAILY, Normal, Disp-270 Tablet, R-3      gabapentin (Neurontin) 100 mg capsule May take 1 capsule up to three times daily for pain, Normal, Disp-15 Capsule, R-0      carvediloL (COREG) 6.25 mg tablet TAKE 1 TABLET BY MOUTH TWICE DAILY, Normal, Disp-180 Tab, R-3**Patient requests 90 days supply**      sildenafil, pulm. hypertension, (REVATIO) 20 mg tablet Take 20 mg by mouth as needed., Historical Med      metFORMIN (GLUCOPHAGE) 500 mg tablet Take 500 mg by mouth two (2) times daily (with meals). , Historical Med      cpap machine kit by Does Not Apply route nightly., Historical Med      simvastatin (ZOCOR) 20 mg tablet Take  by mouth nightly.  , Historical Med      potassium chloride SR (KLOR-CON 10) 10 mEq tablet Take 20 mEq by mouth daily. , Historical Med      aspirin delayed-release (ST. ALEXANDER ASPIRIN) 81 mg tablet Take  by mouth daily. , Historical Med      lisinopril (PRINIVIL, ZESTRIL) 40 mg tablet Take 40 mg by mouth daily. , Historical Med      amlodipine (NORVASC) 10 mg tablet Take  by mouth daily. , Historical Med      hydrochlorothiazide (HYDRODIURIL) 25 mg tablet Take 25 mg by mouth daily. , Historical Med           2. Follow-up Information       Follow up With Specialties Details Why Contact Info    Colton Macdonald MD Family Medicine Schedule an appointment as soon as possible for a visit in 2 days  [address] [place] [address]   [phone]      [facility] EMERGENCY DEPT Emergency Medicine  If symptoms worsen [address] [place] [address]  [address] [facility]  [phone]          Return to ED if worse     Diagnosis     Clinical Impression:   1. Hiccups        Please note that this dictation was completed with Dragon, computer voice recognition software. Quite often unanticipated grammatical, syntax, homophones, and other interpretive errors are inadvertently transcribed by the computer software. Please disregard these errors.   Additionally, please excuse any errors that have escaped final proofreading.

## 2024-05-01 ENCOUNTER — OFFICE VISIT (OUTPATIENT)
Age: 79
End: 2024-05-01
Payer: MEDICARE

## 2024-05-01 VITALS
WEIGHT: 262 LBS | TEMPERATURE: 97.7 F | HEIGHT: 68 IN | SYSTOLIC BLOOD PRESSURE: 136 MMHG | BODY MASS INDEX: 39.71 KG/M2 | OXYGEN SATURATION: 95 % | DIASTOLIC BLOOD PRESSURE: 60 MMHG | HEART RATE: 62 BPM

## 2024-05-01 DIAGNOSIS — I10 PRIMARY HYPERTENSION: ICD-10-CM

## 2024-05-01 DIAGNOSIS — G47.33 OSA (OBSTRUCTIVE SLEEP APNEA): Primary | ICD-10-CM

## 2024-05-01 PROCEDURE — G8417 CALC BMI ABV UP PARAM F/U: HCPCS | Performed by: INTERNAL MEDICINE

## 2024-05-01 PROCEDURE — 3078F DIAST BP <80 MM HG: CPT | Performed by: INTERNAL MEDICINE

## 2024-05-01 PROCEDURE — 3075F SYST BP GE 130 - 139MM HG: CPT | Performed by: INTERNAL MEDICINE

## 2024-05-01 PROCEDURE — G8427 DOCREV CUR MEDS BY ELIG CLIN: HCPCS | Performed by: INTERNAL MEDICINE

## 2024-05-01 PROCEDURE — 99204 OFFICE O/P NEW MOD 45 MIN: CPT | Performed by: INTERNAL MEDICINE

## 2024-05-01 PROCEDURE — 1123F ACP DISCUSS/DSCN MKR DOCD: CPT | Performed by: INTERNAL MEDICINE

## 2024-05-01 PROCEDURE — 1036F TOBACCO NON-USER: CPT | Performed by: INTERNAL MEDICINE

## 2024-05-01 ASSESSMENT — SLEEP AND FATIGUE QUESTIONNAIRES
HOW LIKELY ARE YOU TO NOD OFF OR FALL ASLEEP WHILE WATCHING TV: MODERATE CHANCE OF DOZING
HOW LIKELY ARE YOU TO NOD OFF OR FALL ASLEEP IN A CAR, WHILE STOPPED FOR A FEW MINUTES IN TRAFFIC: SLIGHT CHANCE OF DOZING
HOW LIKELY ARE YOU TO NOD OFF OR FALL ASLEEP WHILE SITTING QUIETLY AFTER LUNCH WITHOUT ALCOHOL: MODERATE CHANCE OF DOZING
HOW LIKELY ARE YOU TO NOD OFF OR FALL ASLEEP WHILE LYING DOWN TO REST IN THE AFTERNOON WHEN CIRCUMSTANCES PERMIT: MODERATE CHANCE OF DOZING
HOW LIKELY ARE YOU TO NOD OFF OR FALL ASLEEP WHILE SITTING AND TALKING TO SOMEONE: SLIGHT CHANCE OF DOZING
HOW LIKELY ARE YOU TO NOD OFF OR FALL ASLEEP WHEN YOU ARE A PASSENGER IN A CAR FOR AN HOUR WITHOUT A BREAK: SLIGHT CHANCE OF DOZING
HOW LIKELY ARE YOU TO NOD OFF OR FALL ASLEEP WHILE SITTING INACTIVE IN A PUBLIC PLACE: MODERATE CHANCE OF DOZING
ESS TOTAL SCORE: 13
NECK CIRCUMFERENCE (INCHES): 18
HOW LIKELY ARE YOU TO NOD OFF OR FALL ASLEEP WHILE SITTING AND READING: MODERATE CHANCE OF DOZING

## 2024-05-01 NOTE — PROGRESS NOTES
[address] [address]  Tel.  [phone]    Fax. [phone]     [address] [address]  Tel.  [phone]    Fax. [phone] [address] [place] [address]  Tel.  [phone]    Fax. [phone]       Anders Kruger is a 78 y.o. year old male seen for evaluation of a sleep disorder.       ASSESSMENT/PLAN:     Diagnosis Orders   1. JUANPABLO (obstructive sleep apnea)  SLEEP STUDY FULL      2. Primary hypertension        3. BMI 39.0-39.9,adult            Patient has a history and examination consistent with the diagnosis of sleep apnea.    Return for follow-up after testing is completed.    * The patient currently has a High Risk for having sleep apnea.  STOP-BANG score 8.    * Sleep testing was ordered for initial evaluation.      Orders Placed This Encounter   Procedures    SLEEP STUDY FULL     Standing Status:   Future     Standing Expiration Date:   5/1/2025     Scheduling Instructions:      Perform Split-Night Testing, starting titration if patient meets criteria.       * He was provided information on sleep apnea including corresponding risk factors and the importance of proper treatment.     * Treatment options were reviewed in detail. he would like to proceed with PAP therapy. Patient will be seen in follow-up in 6-8 weeks after PAP setup to gauge treatment response and adherence to therapy.     * The patient was counseled regarding proper sleep hygiene, with emphasis on ensuring sufficient total sleep time; safe driving and the benefits of exercise and weight loss.      * All of his questions were addressed.    2. Hypertension -  continue on current regimen, he will continue to monitor his BP and follow up with his primary care provider for reevaluation/adjustment of medications if warranted.  I have reviewed the relationship between hypertension as it

## 2024-06-10 ENCOUNTER — HOSPITAL ENCOUNTER (OUTPATIENT)
Facility: HOSPITAL | Age: 79
Discharge: HOME | End: 2024-06-13
Payer: MEDICARE

## 2024-06-10 VITALS
WEIGHT: 242 LBS | DIASTOLIC BLOOD PRESSURE: 68 MMHG | HEIGHT: 68 IN | SYSTOLIC BLOOD PRESSURE: 148 MMHG | BODY MASS INDEX: 36.68 KG/M2 | HEART RATE: 62 BPM | OXYGEN SATURATION: 95 % | TEMPERATURE: 97.3 F

## 2024-06-10 DIAGNOSIS — G47.33 OSA (OBSTRUCTIVE SLEEP APNEA): ICD-10-CM

## 2024-06-10 PROCEDURE — 95811 POLYSOM 6/>YRS CPAP 4/> PARM: CPT | Performed by: INTERNAL MEDICINE

## 2024-06-11 NOTE — PROGRESS NOTES
[address]  [place] [address]  Tel.  [phone]  Fax. [phone] [address]  [place] [address]  Tel.  [phone]  Fax. [phone] [address] [place] [address]  Tel.  [phone]  Fax. [phone]     Sleep Study Technical Notes    Disclaimer:  all notes have not been confirmed by interpreting physician      PRE-Test:  Anders Kruger (: 1945) arrived on time. Vitals taken: temperature (97.3)  BP (148/68) SpO2 (95%) HR (62). he was shown directly to the room. Paperwork completed.  He was left to prepare for bed. He is here for a Split Night study. Procedure explained, questions answered, and he expressed understanding. Electrodes were applied without incident. Once settled in bed, the study was started.    Acquisition Notes:  Lights off: 10:19pm  Sleep onset: 10:34pm  Respiratory events: H,O  Split Night Begins:  12:33am  PAP titration: CPAP  Mask(s) Used: F&P Eson2 nasal mask - Large  Other comments:   Sleep Latency=15 minutes  Respiratory events observed  The patient met the criteria for a split night study  CPAP initiated at 8cm (the patient's home setting)  Pressure increased for supine REM events  Two bathroom breaks  Final Setting: CPAP 9cm, No EPR, No Humidity    POST Test:  Patient awoke on his own. Mask and electrodes were removed. Post Sleep Questionnaire completed. Patient stated they felt okay to drive.   Equipment and room cleaned per infection control policy.    Patient post sleep comments:  How long did you sleep: all night  How long did it take you to fall asleep: 15 minutes  How many times did you wake up: 2 times  Difficulty returning to sleep: no  How did you sleep compared to usual: better       Please comment on your Experience:   \"I slept good\"

## 2024-06-26 ENCOUNTER — CLINICAL DOCUMENTATION (OUTPATIENT)
Age: 79
End: 2024-06-26

## 2024-06-26 DIAGNOSIS — G47.33 OSA (OBSTRUCTIVE SLEEP APNEA): Primary | ICD-10-CM

## 2024-06-27 ENCOUNTER — CLINICAL DOCUMENTATION (OUTPATIENT)
Age: 79
End: 2024-06-27

## 2024-06-27 NOTE — PROGRESS NOTES
Anders Kruger is to be contacted by sleep technologists regarding results of sleep testing which was indicative of an average pAHI 3% of 21.1 and pAHI 4% of 13.1 per hour.  SpO2 mable was 89% and SpO2 of < 88% was 0.00 minutes.        An APAP prescription has been written and patient will be contacted by office staff regarding follow-up  in 2-3 months after initiation of therapy.    Encounter Diagnosis   Name Primary?    JUANPABLO (obstructive sleep apnea) Yes       Orders Placed This Encounter   Procedures    DME Order for (Specify) as OP     - DME device ordered - ResMed Device with Heated Humidifer  / .   - Diagnosis: Obstructive Sleep Apnea (G47.33)  - Length of Need: Lifetime    Pressure Settin cmH2O     Nasal Cushion (Replace) 2 per month.     Nasal Interface Mask 1 every 3 months.    Headgear 1 every 6 months.     Filter(s) Disposable 2 per month.   Filter(s) Non-Disposable 1 every 6 months.      Water Chamber for Humidifier (Replace) 1 every 6 months.   Tubing with heating element 1 every 3 months.    Perform Mask Fitting per patient preference and comfort - replace as above.      Josh Sherman MD, [facility]; NPI: [id number]  Electronically signed. 2024     
show

## 2024-07-01 ENCOUNTER — TELEPHONE (OUTPATIENT)
Age: 79
End: 2024-07-01

## 2024-07-01 NOTE — TELEPHONE ENCOUNTER
Reviewed sleep study results with patient. He expressed understanding and is willing to proceed with a trial of APAP.

## 2024-08-15 ENCOUNTER — CLINICAL DOCUMENTATION (OUTPATIENT)
Age: 79
End: 2024-08-15

## 2024-08-15 ENCOUNTER — OFFICE VISIT (OUTPATIENT)
Age: 79
End: 2024-08-15

## 2024-08-15 VITALS
DIASTOLIC BLOOD PRESSURE: 70 MMHG | BODY MASS INDEX: 39.4 KG/M2 | HEIGHT: 68 IN | HEART RATE: 56 BPM | WEIGHT: 260 LBS | SYSTOLIC BLOOD PRESSURE: 146 MMHG | OXYGEN SATURATION: 94 % | TEMPERATURE: 97 F

## 2024-08-15 DIAGNOSIS — I10 PRIMARY HYPERTENSION: ICD-10-CM

## 2024-08-15 DIAGNOSIS — I48.0 PAROXYSMAL ATRIAL FIBRILLATION (HCC): ICD-10-CM

## 2024-08-15 DIAGNOSIS — G47.33 OSA (OBSTRUCTIVE SLEEP APNEA): Primary | ICD-10-CM

## 2024-08-15 ASSESSMENT — SLEEP AND FATIGUE QUESTIONNAIRES
HOW LIKELY ARE YOU TO NOD OFF OR FALL ASLEEP WHILE SITTING AND READING: WOULD NEVER DOZE
HOW LIKELY ARE YOU TO NOD OFF OR FALL ASLEEP IN A CAR, WHILE STOPPED FOR A FEW MINUTES IN TRAFFIC: WOULD NEVER DOZE
HOW LIKELY ARE YOU TO NOD OFF OR FALL ASLEEP WHILE SITTING AND TALKING TO SOMEONE: WOULD NEVER DOZE
HOW LIKELY ARE YOU TO NOD OFF OR FALL ASLEEP WHILE WATCHING TV: SLIGHT CHANCE OF DOZING
HOW LIKELY ARE YOU TO NOD OFF OR FALL ASLEEP WHILE SITTING INACTIVE IN A PUBLIC PLACE: WOULD NEVER DOZE
HOW LIKELY ARE YOU TO NOD OFF OR FALL ASLEEP WHEN YOU ARE A PASSENGER IN A CAR FOR AN HOUR WITHOUT A BREAK: WOULD NEVER DOZE
HOW LIKELY ARE YOU TO NOD OFF OR FALL ASLEEP WHILE SITTING QUIETLY AFTER LUNCH WITHOUT ALCOHOL: WOULD NEVER DOZE
ESS TOTAL SCORE: 2
HOW LIKELY ARE YOU TO NOD OFF OR FALL ASLEEP WHILE LYING DOWN TO REST IN THE AFTERNOON WHEN CIRCUMSTANCES PERMIT: SLIGHT CHANCE OF DOZING

## 2024-08-15 NOTE — PROGRESS NOTES
[address]   [place] [address]   Tel.  [phone]   Fax. [phone]  [address]   [place] [address]   Tel.  [phone]   Fax. [phone] [address] [place] [address]   Tel.  [phone]   Fax. [phone]     Anders Kruger (: 1945) is a 78 y.o. male, established patient, seen for positive airway pressure follow-up and evaluation.  He was last seen by Dr. Sherman on 2024, prior notes reviewed in detail. Initial diagnosis several years ago for which he had remained on PAP. In lab sleep test 2024 showed pAHI 3% of 21.1 and pAHI 4% of 13.1 per hour.  SpO2 mable was 89% and SpO2 of < 88% was 0.00 minutes. He is seen today for follow up.      ASSESSMENT/PLAN:   Diagnosis Orders   1. JUANPABLO (obstructive sleep apnea)  DME Order for (Specify) as OP      2. Primary hypertension        3. Paroxysmal atrial fibrillation (HCC)          AHI = 21.1/13.1 ().  On CPAP, Resmed :  9 cmH2O. Set up .    He is adherent with PAP therapy and PAP continues to benefit patient and remains necessary for control of his sleep apnea.     No follow-up provider specified.    Sleep Apnea - Continue on current pressures.     Orders Placed This Encounter   Procedures    DME Order for (Specify) as OP     Diagnosis: (G47.33) JUANPABLO (obstructive sleep apnea)  (primary encounter diagnosis)     Replacement Supplies for Positive Airway Pressure Therapy Device:   Duration of need: 99 months.      Nasal Pillows Combo Mask (Replace) 2 per month.   Nasal Pillows (Replace) 2 per month.    Nasal Cushion (Replace) 2 per month.   Nasal Interface Mask 1 every 3 months.     Headgear 1 every 6 months.   Positive Airway Pressure chinstrap 1 every 6 months.     Tubing with heating element 1 every 3 months.     Filter(s) Disposable 2 per month.   Filter(s) Non-Disposable 1 every 6 months.   .    Water Chamber for Humidifier

## 2025-07-15 ENCOUNTER — TELEPHONE (OUTPATIENT)
Age: 80
End: 2025-07-15

## 2025-07-17 ENCOUNTER — CLINICAL DOCUMENTATION (OUTPATIENT)
Age: 80
End: 2025-07-17

## 2025-07-17 ENCOUNTER — OFFICE VISIT (OUTPATIENT)
Age: 80
End: 2025-07-17

## 2025-07-17 VITALS
TEMPERATURE: 98.6 F | SYSTOLIC BLOOD PRESSURE: 110 MMHG | WEIGHT: 237 LBS | DIASTOLIC BLOOD PRESSURE: 53 MMHG | BODY MASS INDEX: 35.92 KG/M2 | HEIGHT: 68 IN | HEART RATE: 58 BPM | OXYGEN SATURATION: 96 %

## 2025-07-17 DIAGNOSIS — I10 PRIMARY HYPERTENSION: ICD-10-CM

## 2025-07-17 DIAGNOSIS — G47.33 OSA (OBSTRUCTIVE SLEEP APNEA): Primary | ICD-10-CM

## 2025-07-17 DIAGNOSIS — I48.0 PAROXYSMAL ATRIAL FIBRILLATION (HCC): ICD-10-CM

## 2025-07-17 ASSESSMENT — SLEEP AND FATIGUE QUESTIONNAIRES
HOW LIKELY ARE YOU TO NOD OFF OR FALL ASLEEP WHEN YOU ARE A PASSENGER IN A CAR FOR AN HOUR WITHOUT A BREAK: WOULD NEVER DOZE
HOW LIKELY ARE YOU TO NOD OFF OR FALL ASLEEP WHILE SITTING QUIETLY AFTER LUNCH WITHOUT ALCOHOL: WOULD NEVER DOZE
HOW LIKELY ARE YOU TO NOD OFF OR FALL ASLEEP IN A CAR, WHILE STOPPED FOR A FEW MINUTES IN TRAFFIC: WOULD NEVER DOZE
HOW LIKELY ARE YOU TO NOD OFF OR FALL ASLEEP WHILE SITTING AND TALKING TO SOMEONE: WOULD NEVER DOZE
HOW LIKELY ARE YOU TO NOD OFF OR FALL ASLEEP WHILE SITTING INACTIVE IN A PUBLIC PLACE: WOULD NEVER DOZE
HOW LIKELY ARE YOU TO NOD OFF OR FALL ASLEEP WHILE LYING DOWN TO REST IN THE AFTERNOON WHEN CIRCUMSTANCES PERMIT: WOULD NEVER DOZE
ESS TOTAL SCORE: 4
HOW LIKELY ARE YOU TO NOD OFF OR FALL ASLEEP WHILE SITTING AND READING: MODERATE CHANCE OF DOZING
HOW LIKELY ARE YOU TO NOD OFF OR FALL ASLEEP WHILE WATCHING TV: MODERATE CHANCE OF DOZING

## 2025-07-17 NOTE — PATIENT INSTRUCTIONS
[address]  [place] [address]  Tel.  [phone]  Fax. [phone] [address]  [place] [address]  Tel.  [phone]  Fax. [phone] [address] [address]  Tel.  [phone]  Fax. [phone]     Learning About CPAP for Sleep Apnea  What is CPAP?              CPAP is a small machine that you use at home every night while you sleep. It increases air pressure in your throat to keep your airway open. When you have sleep apnea, this can help you sleep better so you feel much better. CPAP stands for \"continuous positive airway pressure.\"  The CPAP machine will have one of the following:  A mask that covers your nose and mouth  Prongs that fit into your nose  A mask that covers your nose only, the most common type. This type is called NCPAP. The N stands for \"nasal.\"  Why is it done?  CPAP is usually the best treatment for obstructive sleep apnea. It is the first treatment choice and the most widely used. Your doctor may suggest CPAP if you have:  Moderate to severe sleep apnea.  Sleep apnea and coronary artery disease (CAD) or heart failure.  How does it help?  CPAP can help you have more normal sleep, so you feel less sleepy and more alert during the daytime.  CPAP may help keep heart failure or other heart problems from getting worse.  NCPAP may help lower your blood pressure.  If you use CPAP, your bed partner may also sleep better because you are not snoring or restless.  What are the side effects?  Some people who use CPAP have:  A dry or stuffy nose and a sore throat.  Irritated skin on the face.  Sore eyes.  Bloating.  If you have any of these problems, work with your doctor to fix them. Here are some things you can try:  Be sure the mask or nasal prongs fit well.  See if your doctor can adjust the pressure of your CPAP.  If your nose is dry, try a humidifier.  If your nose is runny or stuffy, try decongestant medicine or a steroid

## (undated) DEVICE — CATH IV AUTOGRD BC PNK 20GA 25 -- INSYTE

## (undated) DEVICE — NEONATAL-ADULT SPO2 SENSOR: Brand: NELLCOR

## (undated) DEVICE — Device

## (undated) DEVICE — SNARE ENDOSCP M L240CM W27MM SHTH DIA2.4MM CHN 2.8MM OVL

## (undated) DEVICE — STRAINER URIN CALC RNL MSH -- CONVERT TO ITEM 357634

## (undated) DEVICE — TOWEL 4 PLY TISS 19X30 SUE WHT

## (undated) DEVICE — ELECTRODE,RADIOTRANSLUCENT,FOAM,5PK: Brand: MEDLINE

## (undated) DEVICE — Z DISCONTINUED PER MEDLINE LINE GAS SAMPLING O2/CO2 LNG AD 13 FT NSL W/ TBNG FILTERLINE

## (undated) DEVICE — SYR 3ML LL TIP 1/10ML GRAD --

## (undated) DEVICE — SOLIDIFIER FLD 2OZ 1500CC N DISINF IN BTL DISP SAFESORB

## (undated) DEVICE — SOLIDIFIER MEDC 1200ML -- CONVERT TO 356117

## (undated) DEVICE — SYR 10ML LUER LOK 1/5ML GRAD --

## (undated) DEVICE — BASIN EMSIS 16OZ GRAPHITE PLAS KID SHP MOLD GRAD FOR ORAL

## (undated) DEVICE — CONTAINER SPEC 20 ML LID NEUT BUFF FORMALIN 10 % POLYPR STS

## (undated) DEVICE — SET ADMIN 16ML TBNG L100IN 2 Y INJ SITE IV PIGGY BK DISP

## (undated) DEVICE — KENDALL RADIOLUCENT FOAM MONITORING ELECTRODE RECTANGULAR SHAPE: Brand: KENDALL

## (undated) DEVICE — TRAP,MUCUS SPECIMEN, 80CC: Brand: MEDLINE

## (undated) DEVICE — NEEDLE HYPO 18GA L1.5IN PNK S STL HUB POLYPR SHLD REG BVL

## (undated) DEVICE — 1200 GUARD II KIT W/5MM TUBE W/O VAC TUBE: Brand: GUARDIAN

## (undated) DEVICE — NON-REM POLYHESIVE PATIENT RETURN ELECTRODE: Brand: VALLEYLAB

## (undated) DEVICE — HYPODERMIC SAFETY NEEDLE: Brand: MAGELLAN